# Patient Record
Sex: FEMALE | Race: WHITE | NOT HISPANIC OR LATINO | Employment: UNEMPLOYED | URBAN - METROPOLITAN AREA
[De-identification: names, ages, dates, MRNs, and addresses within clinical notes are randomized per-mention and may not be internally consistent; named-entity substitution may affect disease eponyms.]

---

## 2017-01-06 ENCOUNTER — OFFICE VISIT (OUTPATIENT)
Dept: RETAIL CLINIC | Facility: CLINIC | Age: 47
End: 2017-01-06

## 2017-01-06 VITALS
DIASTOLIC BLOOD PRESSURE: 62 MMHG | OXYGEN SATURATION: 98 % | SYSTOLIC BLOOD PRESSURE: 100 MMHG | HEART RATE: 89 BPM | RESPIRATION RATE: 20 BRPM | TEMPERATURE: 99 F

## 2017-01-06 DIAGNOSIS — J01.00 ACUTE NON-RECURRENT MAXILLARY SINUSITIS: Primary | ICD-10-CM

## 2017-01-06 PROCEDURE — 99213 OFFICE O/P EST LOW 20 MIN: CPT | Performed by: NURSE PRACTITIONER

## 2017-01-06 RX ORDER — DOXYCYCLINE HYCLATE 100 MG/1
100 TABLET, DELAYED RELEASE ORAL 2 TIMES DAILY
Qty: 20 TABLET | Refills: 0 | Status: SHIPPED | OUTPATIENT
Start: 2017-01-06 | End: 2017-01-14 | Stop reason: SINTOL

## 2017-01-06 RX ORDER — FLUTICASONE PROPIONATE 50 MCG
2 SPRAY, SUSPENSION (ML) NASAL DAILY
COMMUNITY
End: 2019-01-19

## 2017-01-06 NOTE — MR AVS SNAPSHOT
Ericka Goel   1/6/2017 5:00 PM   Office Visit    Dept Phone:  [phone]   Encounter #:  [id number]    Provider:  Nidia Zepeda   Department:  [facility]                Your Full Care Plan              Today's Medication Changes          These changes are accurate as of: 1/6/17  5:48 PM.  If you have any questions, ask your nurse or doctor.               New Medication(s)Ordered:     doxycycline 100 MG enteric coated tablet   Commonly known as:  DORYX   Take 1 tablet by mouth 2 (Two) Times a Day for 10 days.   Started by:  Nidia Zepeda         Medication(s)that have changed:     albuterol 108 (90 BASE) MCG/ACT inhaler   Commonly known as:  PROVENTIL HFA;VENTOLIN HFA   Inhale 2 puffs Every 4 (Four) Hours As Needed for wheezing.   What changed:  Another medication with the same name was removed. Continue taking this medication, and follow the directions you see here.   Changed by:  Nidia Zepeda         Stop taking medication(s)listed here:     azithromycin 250 MG tablet   Commonly known as:  ZITHROMAX   Stopped by:  Nidia Zepeda           brompheniramine-pseudoephedrine-DM 30-2-10 MG/5ML syrup   Stopped by:  Nidia Zepeda           CYMBALTA 60 MG capsule   Generic drug:  DULoxetine   Stopped by:  Nidia Zepeda           lurasidone 40 MG tablet tablet   Commonly known as:  LATUDA   Stopped by:  Nidia Zepeda                Where to Get Your Medications      These medications were sent to [facility] - [place] - [address] - [phone]  - [phone]   [address], [place] [address]     Phone:  [phone]     doxycycline 100 MG enteric coated tablet                  Your Updated Medication List          This list is accurate as of: 1/6/17  5:48 PM.  Always use your most recent med list.                albuterol 108 (90 BASE) MCG/ACT inhaler   Commonly known as:  PROVENTIL  HFA;VENTOLIN HFA   Inhale 2 puffs Every 4 (Four) Hours As Needed for wheezing.       doxycycline 100 MG enteric coated tablet   Commonly known as:  DORYX   Take 1 tablet by mouth 2 (Two) Times a Day for 10 days.       EFFEXOR PO       fluticasone 50 MCG/ACT nasal spray   Commonly known as:  FLONASE               You Were Diagnosed With        Codes Comments    Acute non-recurrent maxillary sinusitis    -  Primary ICD-10-CM: J01.00  ICD-9-CM: 461.0       Instructions    Sinusitis, Adult  Sinusitis is redness, soreness, and inflammation of the paranasal sinuses. Paranasal sinuses are air pockets within the bones of your face. They are located beneath your eyes, in the middle of your forehead, and above your eyes. In healthy paranasal sinuses, mucus is able to drain out, and air is able to circulate through them by way of your nose. However, when your paranasal sinuses are inflamed, mucus and air can become trapped. This can allow bacteria and other germs to grow and cause infection.  Sinusitis can develop quickly and last only a short time (acute) or continue over a long period (chronic). Sinusitis that lasts for more than 12 weeks is considered chronic.  CAUSES  Causes of sinusitis include:  · Allergies.  · Structural abnormalities, such as displacement of the cartilage that separates your nostrils (deviated septum), which can decrease the air flow through your nose and sinuses and affect sinus drainage.  · Functional abnormalities, such as when the small hairs (cilia) that line your sinuses and help remove mucus do not work properly or are not present.  SIGNS AND SYMPTOMS  Symptoms of acute and chronic sinusitis are the same. The primary symptoms are pain and pressure around the affected sinuses. Other symptoms include:  · Upper toothache.  · Earache.  · Headache.  · Bad breath.  · Decreased sense of smell and taste.  · A cough, which worsens when you are lying flat.  · Fatigue.  · Fever.  · Thick drainage from your  nose, which often is green and may contain pus (purulent).  · Swelling and warmth over the affected sinuses.  DIAGNOSIS  Your health care provider will perform a physical exam. During your exam, your health care provider may perform any of the following to help determine if you have acute sinusitis or chronic sinusitis:  · Look in your nose for signs of abnormal growths in your nostrils (nasal polyps).  · Tap over the affected sinus to check for signs of infection.  · View the inside of your sinuses using an imaging device that has a light attached (endoscope).  If your health care provider suspects that you have chronic sinusitis, one or more of the following tests may be recommended:  · Allergy tests.  · Nasal culture. A sample of mucus is taken from your nose, sent to a lab, and screened for bacteria.  · Nasal cytology. A sample of mucus is taken from your nose and examined by your health care provider to determine if your sinusitis is related to an allergy.  TREATMENT  Most cases of acute sinusitis are related to a viral infection and will resolve on their own within 10 days. Sometimes, medicines are prescribed to help relieve symptoms of both acute and chronic sinusitis. These may include pain medicines, decongestants, nasal steroid sprays, or saline sprays.  However, for sinusitis related to a bacterial infection, your health care provider will prescribe antibiotic medicines. These are medicines that will help kill the bacteria causing the infection.  Rarely, sinusitis is caused by a fungal infection. In these cases, your health care provider will prescribe antifungal medicine.  For some cases of chronic sinusitis, surgery is needed. Generally, these are cases in which sinusitis recurs more than 3 times per year, despite other treatments.  HOME CARE INSTRUCTIONS  · Drink plenty of water. Water helps thin the mucus so your sinuses can drain more easily.  · Use a humidifier.  · Inhale steam 3-4 times a day (for  example, sit in the bathroom with the shower running).  · Apply a warm, moist washcloth to your face 3-4 times a day, or as directed by your health care provider.  · Use saline nasal sprays to help moisten and clean your sinuses.  · Take medicines only as directed by your health care provider.  · If you were prescribed either an antibiotic or antifungal medicine, finish it all even if you start to feel better.  SEEK IMMEDIATE MEDICAL CARE IF:  · You have increasing pain or severe headaches.  · You have nausea, vomiting, or drowsiness.  · You have swelling around your face.  · You have vision problems.  · You have a stiff neck.  · You have difficulty breathing.     This information is not intended to replace advice given to you by your health care provider. Make sure you discuss any questions you have with your health care provider.     Document Released: 2006 Document Revised: 2016 Document Reviewed: 2013  [email or web address] Interactive Patient Education © [employer] Inc.       Patient Instructions History      Upcoming Appointments     Visit Type Date Time Department    OFFICE VISIT 2017  5:00 PM [facility]      [employer] Signup     [employer] allows you to send messages to your doctor, view your test results, renew your prescriptions, schedule appointments, and more. To sign up, go to [employer] and click on the Sign Up Now link in the New User? box. Enter your [employer] Activation Code exactly as it appears below along with the last four digits of your Social Security Number and your Date of Birth () to complete the sign-up process. If you do not sign up before the expiration date, you must request a new code.    [employer] Activation Code: [email or web address]  Expires: 2017  5:48 PM    If you have questions, you can email [email or web address] or call [phone] to talk to our [employer] staff. Remember, [employer] is NOT to be used for urgent needs. For medical  emergencies, dial 911.               Other Info from Your Visit           Allergies     Cephalosporins      Geodon  [Ziprasidone Hcl]      Penicillins      Sulfa Antibiotics        Reason for Visit     URI           Vital Signs     Blood Pressure Pulse Temperature Respirations Oxygen Saturation Smoking Status    100/62 89 99 °F (37.2 °C) 20 98% Current Every Day Smoker      Problems and Diagnoses Noted     Acute non-recurrent maxillary sinusitis    -  Primary

## 2017-01-06 NOTE — PROGRESS NOTES
Subjective   Ericka Goel is a 46 y.o. female.     URI    This is a new problem. Episode onset: 3 weeks ago. The problem has been gradually worsening. There has been no fever. Associated symptoms include congestion, coughing, headaches, a plugged ear sensation, rhinorrhea and sinus pain (left side only). Pertinent negatives include no abdominal pain, chest pain, diarrhea, ear pain, nausea, neck pain, rash, sneezing, sore throat, swollen glands, vomiting or wheezing. Treatments tried: OTC sinus  The treatment provided no relief.       The following portions of the patient's history were reviewed and updated as appropriate: allergies, current medications, past family history, past medical history, past social history, past surgical history and problem list.    Review of Systems   Constitutional: Positive for fatigue. Negative for appetite change, chills, diaphoresis and fever.   HENT: Positive for congestion, postnasal drip, rhinorrhea and sinus pressure. Negative for dental problem, ear discharge, ear pain, facial swelling, hearing loss, mouth sores, nosebleeds, sneezing, sore throat, tinnitus, trouble swallowing and voice change.    Eyes: Negative for pain, discharge, redness and itching.   Respiratory: Positive for cough. Negative for chest tightness, shortness of breath, wheezing and stridor.    Cardiovascular: Negative for chest pain and palpitations.   Gastrointestinal: Negative for abdominal pain, constipation, diarrhea, nausea and vomiting.   Genitourinary: Negative for decreased urine volume.   Musculoskeletal: Negative for myalgias, neck pain and neck stiffness.   Skin: Negative for rash.   Allergic/Immunologic: Positive for environmental allergies.   Neurological: Positive for headaches. Negative for dizziness, syncope and weakness.       Objective   Physical Exam   Constitutional: She is oriented to person, place, and time. She appears well-developed and well-nourished. She is cooperative.  Non-toxic  appearance. She does not appear ill. No distress.   HENT:   Right Ear: Hearing, tympanic membrane, external ear and ear canal normal.   Left Ear: Hearing, tympanic membrane, external ear and ear canal normal.   Nose: Mucosal edema and sinus tenderness present. No rhinorrhea. Right sinus exhibits no maxillary sinus tenderness and no frontal sinus tenderness. Left sinus exhibits maxillary sinus tenderness and frontal sinus tenderness.   Mouth/Throat: Uvula is midline, oropharynx is clear and moist and mucous membranes are normal. Tonsils are 1+ on the right. Tonsils are 1+ on the left. No tonsillar exudate.   Moderate amount of purulent post nasal drainage present   Eyes: Conjunctivae and lids are normal.   Cardiovascular: Normal rate, regular rhythm, S1 normal and S2 normal.    Pulmonary/Chest: Effort normal and breath sounds normal.   Abdominal: Soft. Normal appearance and bowel sounds are normal. There is no tenderness.   Lymphadenopathy:     She has no cervical adenopathy.   Neurological: She is alert and oriented to person, place, and time.   Skin: Skin is warm and dry. She is not diaphoretic. No pallor.   Vitals reviewed.      Assessment/Plan   Ericka was seen today for uri.    Diagnoses and all orders for this visit:    Acute non-recurrent maxillary sinusitis    Other orders  -     doxycycline (DORYX) 100 MG enteric coated tablet; Take 1 tablet by mouth 2 (Two) Times a Day for 10 days.        -     F/u with PCP for persistent or worsening symptoms

## 2017-01-06 NOTE — PATIENT INSTRUCTIONS

## 2017-01-14 ENCOUNTER — OFFICE VISIT (OUTPATIENT)
Dept: RETAIL CLINIC | Facility: CLINIC | Age: 47
End: 2017-01-14

## 2017-01-14 VITALS
RESPIRATION RATE: 18 BRPM | DIASTOLIC BLOOD PRESSURE: 78 MMHG | TEMPERATURE: 98.8 F | OXYGEN SATURATION: 98 % | HEART RATE: 93 BPM | SYSTOLIC BLOOD PRESSURE: 130 MMHG

## 2017-01-14 DIAGNOSIS — H65.191 OTHER ACUTE NONSUPPURATIVE OTITIS MEDIA OF RIGHT EAR, RECURRENCE NOT SPECIFIED: Primary | ICD-10-CM

## 2017-01-14 DIAGNOSIS — R09.81 CONGESTION OF NASAL SINUS: ICD-10-CM

## 2017-01-14 PROBLEM — H92.09 EAR PAIN: Status: ACTIVE | Noted: 2017-01-14

## 2017-01-14 PROBLEM — J34.9 SINUS PROBLEM: Status: ACTIVE | Noted: 2017-01-14

## 2017-01-14 PROCEDURE — 99213 OFFICE O/P EST LOW 20 MIN: CPT | Performed by: NURSE PRACTITIONER

## 2017-01-14 RX ORDER — AZITHROMYCIN 250 MG/1
TABLET, FILM COATED ORAL
Qty: 6 TABLET | Refills: 0 | Status: SHIPPED | OUTPATIENT
Start: 2017-01-14 | End: 2017-05-31

## 2017-01-14 RX ORDER — ECHINACEA PURPUREA EXTRACT 125 MG
2 TABLET ORAL AS NEEDED
Qty: 1 EACH | Refills: 0 | Status: SHIPPED | OUTPATIENT
Start: 2017-01-14 | End: 2017-01-24

## 2017-01-14 NOTE — PROGRESS NOTES
Kimberly Barnett Amando is a 46 y.o. female.     Earache    There is pain in the right ear. This is a new problem. The current episode started in the past 7 days. The problem occurs constantly. The problem has been gradually worsening. There has been no fever. The pain is at a severity of 5/10. The pain is moderate. Associated symptoms include headaches and rhinorrhea. Pertinent negatives include no coughing, diarrhea, ear discharge, neck pain, rash or vomiting. She has tried NSAIDs (recently stopped doxycycline due to side effects) for the symptoms. The treatment provided mild relief.       The following portions of the patient's history were reviewed and updated as appropriate: allergies, current medications, past family history, past medical history, past social history, past surgical history and problem list.    Review of Systems   Constitutional: Positive for activity change, appetite change and fatigue.   HENT: Positive for congestion, ear pain, rhinorrhea and sinus pressure. Negative for ear discharge.    Eyes: Negative for discharge.   Respiratory: Negative for cough, chest tightness and wheezing.    Cardiovascular: Negative for chest pain and palpitations.   Gastrointestinal: Negative for abdominal distention, blood in stool, constipation, diarrhea, nausea and vomiting.   Endocrine: Negative for cold intolerance.   Genitourinary: Negative for difficulty urinating.   Musculoskeletal: Negative for gait problem, neck pain and neck stiffness.   Skin: Negative for color change, pallor and rash.   Allergic/Immunologic: Positive for environmental allergies.   Neurological: Positive for headaches. Negative for dizziness.   Psychiatric/Behavioral: Negative for agitation, behavioral problems and confusion.   All other systems reviewed and are negative.      Objective   Physical Exam   Constitutional: She is oriented to person, place, and time. She appears well-developed and well-nourished.   HENT:   Head:  Normocephalic.   Right Ear: Hearing, external ear and ear canal normal. There is tenderness. Tympanic membrane is erythematous and retracted. Tympanic membrane mobility is abnormal.   Left Ear: Hearing, tympanic membrane, external ear and ear canal normal.   Eyes: Conjunctivae, EOM and lids are normal. Pupils are equal, round, and reactive to light.   Neck: Trachea normal and full passive range of motion without pain.   Cardiovascular: Normal rate, regular rhythm, normal heart sounds and normal pulses.    Pulmonary/Chest: Effort normal and breath sounds normal.   Abdominal: Soft. Normal appearance and bowel sounds are normal. There is no tenderness.   Musculoskeletal: Normal range of motion.   Lymphadenopathy:     She has no cervical adenopathy.   Neurological: She is alert and oriented to person, place, and time. She has normal strength.   Skin: Skin is warm, dry and intact. No rash noted.   Psychiatric: She has a normal mood and affect. Her speech is normal and behavior is normal. Judgment and thought content normal. Cognition and memory are normal.       Assessment/Plan   Ericka was seen today for earache.    Diagnoses and all orders for this visit:    Other acute nonsuppurative otitis media of right ear, recurrence not specified  -     azithromycin (ZITHROMAX) 250 MG tablet; Take 2 tablets the first day, then 1 tablet daily for 4 days.    Congestion of nasal sinus  -     sodium chloride (OCEAN NASAL SPRAY) 0.65 % nasal spray; 2 sprays into each nostril As Needed for congestion for up to 10 days.       This patient stopped taking the doxycycline she was prescribed one week ago due to a burning sensation in the esophagus.  She presents with ear pain and several allergies to different types of medications.  For this reason, she was placed on azithromycin.  Patient instructed to return to PCP or seek ER if not improving.

## 2017-01-14 NOTE — PATIENT INSTRUCTIONS
Otitis Media, Adult  Otitis media is redness, soreness, and puffiness (swelling) in the space just behind your eardrum (middle ear). It may be caused by allergies or infection. It often happens along with a cold.  HOME CARE  · Take your medicine as told. Finish it even if you start to feel better.  · Only take over-the-counter or prescription medicines for pain, discomfort, or fever as told by your doctor.  · Follow up with your doctor as told.  GET HELP IF:  · You have otitis media only in one ear, or bleeding from your nose, or both.  · You notice a lump on your neck.  · You are not getting better in 3-5 days.  · You feel worse instead of better.  GET HELP RIGHT AWAY IF:   · You have pain that is not helped with medicine.  · You have puffiness, redness, or pain around your ear.  · You get a stiff neck.  · You cannot move part of your face (paralysis).  · You notice that the bone behind your ear hurts when you touch it.  MAKE SURE YOU:   · Understand these instructions.  · Will watch your condition.  · Will get help right away if you are not doing well or get worse.     This information is not intended to replace advice given to you by your health care provider. Make sure you discuss any questions you have with your health care provider.     Document Released: 06/05/2009 Document Revised: 01/08/2016 Document Reviewed: 07/15/2014  [employer] Interactive Patient Education ©2016 [employer] Inc.

## 2017-01-14 NOTE — MR AVS SNAPSHOT
Ericka Goel   1/14/2017 3:00 PM   Office Visit    Dept Phone:  [phone]   Encounter #:  [id number]    Provider:  Nidia Zepeda   Department:  [facility]                Your Full Care Plan              Today's Medication Changes          These changes are accurate as of: 1/14/17  3:28 PM.  If you have any questions, ask your nurse or doctor.               New Medication(s)Ordered:     azithromycin 250 MG tablet   Commonly known as:  ZITHROMAX   Take 2 tablets the first day, then 1 tablet daily for 4 days.   Started by:  Nidia Zepeda       sodium chloride 0.65 % nasal spray   Commonly known as:  OCEAN NASAL SPRAY   2 sprays into each nostril As Needed for congestion for up to 10 days.   Started by:  Provider Lauren Zepeda         Stop taking medication(s)listed here:     doxycycline 100 MG enteric coated tablet   Commonly known as:  DORYX   Stopped by:  Provider Lauren Zepeda                Where to Get Your Medications      These medications were sent to [facility]/pharmacy #[id number] - [facility] [address] [address] AT [address] [phone] PH - [phone] [address] [address]     Phone:  [phone]     azithromycin 250 MG tablet    sodium chloride 0.65 % nasal spray                  Your Updated Medication List          This list is accurate as of: 1/14/17  3:28 PM.  Always use your most recent med list.                albuterol 108 (90 BASE) MCG/ACT inhaler   Commonly known as:  PROVENTIL HFA;VENTOLIN HFA   Inhale 2 puffs Every 4 (Four) Hours As Needed for wheezing.       azithromycin 250 MG tablet   Commonly known as:  ZITHROMAX   Take 2 tablets the first day, then 1 tablet daily for 4 days.       EFFEXOR PO       fluticasone 50 MCG/ACT nasal spray   Commonly known as:  FLONASE       sodium chloride 0.65 % nasal spray   Commonly known as:  OCEAN NASAL SPRAY   2 sprays into each nostril As Needed for congestion for up  to 10 days.               You Were Diagnosed With        Codes Comments    Other acute nonsuppurative otitis media of right ear, recurrence not specified    -  Primary ICD-10-CM: H65.191  ICD-9-CM: 381.00       Instructions    Otitis Media, Adult  Otitis media is redness, soreness, and puffiness (swelling) in the space just behind your eardrum (middle ear). It may be caused by allergies or infection. It often happens along with a cold.  HOME CARE  · Take your medicine as told. Finish it even if you start to feel better.  · Only take over-the-counter or prescription medicines for pain, discomfort, or fever as told by your doctor.  · Follow up with your doctor as told.  GET HELP IF:  · You have otitis media only in one ear, or bleeding from your nose, or both.  · You notice a lump on your neck.  · You are not getting better in 3-5 days.  · You feel worse instead of better.  GET HELP RIGHT AWAY IF:   · You have pain that is not helped with medicine.  · You have puffiness, redness, or pain around your ear.  · You get a stiff neck.  · You cannot move part of your face (paralysis).  · You notice that the bone behind your ear hurts when you touch it.  MAKE SURE YOU:   · Understand these instructions.  · Will watch your condition.  · Will get help right away if you are not doing well or get worse.     This information is not intended to replace advice given to you by your health care provider. Make sure you discuss any questions you have with your health care provider.     Document Released: 06/05/2009 Document Revised: 01/08/2016 Document Reviewed: 07/15/2014  [employer] Interactive Patient Education ©2016 [employer]       Patient Instructions History      Upcoming Appointments     Visit Type Date Time Department    OFFICE VISIT 1/14/2017  3:00 PM [facility]      [employer] [facility] [employer] allows you to send messages to your doctor, view your test results, renew your prescriptions, schedule  appointments, and more. To sign up, go to [email or web address] and click on the Sign Up Now link in the New User? box. Enter your [employer] Activation Code exactly as it appears below along with the last four digits of your Social Security Number and your Date of Birth () to complete the sign-up process. If you do not sign up before the expiration date, you must request a new code.    [employer] Activation Code: [email or web address]  Expires: 2017  5:48 PM    If you have questions, you can email [email or web address] or call [phone] to talk to our [employer] staff. Remember, [employer] is NOT to be used for urgent needs. For medical emergencies, dial 911.               Other Info from Your Visit           Allergies     Cephalosporins      Doxycycline Hyclate  Other (See Comments)    Esophagus burns    Geodon  [Ziprasidone Hcl]      Penicillins      Sulfa Antibiotics        Vital Signs     Blood Pressure Pulse Temperature Respirations Last Menstrual Period Oxygen Saturation    130/78 (BP Location: Left arm, Patient Position: Sitting, Cuff Size: Adult) 93 98.8 °F (37.1 °C) (Oral) 18 2016 98%    Smoking Status                   Current Every Day Smoker           Problems and Diagnoses Noted     Disorder of nasal sinus    Middle ear infection    -  Primary

## 2017-02-23 ENCOUNTER — TRANSCRIBE ORDERS (OUTPATIENT)
Dept: ADMINISTRATIVE | Facility: HOSPITAL | Age: 47
End: 2017-02-23

## 2017-03-07 ENCOUNTER — TRANSCRIBE ORDERS (OUTPATIENT)
Dept: ADMINISTRATIVE | Facility: HOSPITAL | Age: 47
End: 2017-03-07

## 2017-03-07 DIAGNOSIS — J33.9 NASAL POLYPS: Primary | ICD-10-CM

## 2017-03-21 ENCOUNTER — HOSPITAL ENCOUNTER (OUTPATIENT)
Dept: CT IMAGING | Facility: HOSPITAL | Age: 47
Discharge: HOME | End: 2017-03-21
Admitting: FAMILY MEDICINE

## 2017-03-21 DIAGNOSIS — J33.9 NASAL POLYPS: ICD-10-CM

## 2017-03-21 PROCEDURE — 70486 CT MAXILLOFACIAL W/O DYE: CPT

## 2017-05-31 ENCOUNTER — OFFICE VISIT (OUTPATIENT)
Dept: OBSTETRICS AND GYNECOLOGY | Facility: CLINIC | Age: 47
End: 2017-05-31

## 2017-05-31 VITALS
BODY MASS INDEX: 22.5 KG/M2 | DIASTOLIC BLOOD PRESSURE: 64 MMHG | HEIGHT: 66 IN | WEIGHT: 140 LBS | SYSTOLIC BLOOD PRESSURE: 112 MMHG

## 2017-05-31 DIAGNOSIS — Z11.3 SCREEN FOR STD (SEXUALLY TRANSMITTED DISEASE): ICD-10-CM

## 2017-05-31 DIAGNOSIS — Z13.9 SCREENING: Primary | ICD-10-CM

## 2017-05-31 DIAGNOSIS — Z01.419 ENCOUNTER FOR GYNECOLOGICAL EXAMINATION WITHOUT ABNORMAL FINDING: ICD-10-CM

## 2017-05-31 DIAGNOSIS — F17.200 SMOKER: ICD-10-CM

## 2017-05-31 LAB
B-HCG UR QL: NEGATIVE
BILIRUB BLD-MCNC: NEGATIVE MG/DL
CLARITY, POC: CLEAR
COLOR UR: YELLOW
GLUCOSE UR STRIP-MCNC: NEGATIVE MG/DL
INTERNAL NEGATIVE CONTROL: NEGATIVE
INTERNAL POSITIVE CONTROL: POSITIVE
KETONES UR QL: NEGATIVE
LEUKOCYTE EST, POC: NEGATIVE
Lab: NORMAL
NITRITE UR-MCNC: NEGATIVE MG/ML
PH UR: 7 [PH] (ref 5–8)
PROT UR STRIP-MCNC: NEGATIVE MG/DL
RBC # UR STRIP: NEGATIVE /UL
SP GR UR: 1.02 (ref 1–1.03)
UROBILINOGEN UR QL: NORMAL

## 2017-05-31 PROCEDURE — 99396 PREV VISIT EST AGE 40-64: CPT | Performed by: OBSTETRICS & GYNECOLOGY

## 2017-05-31 PROCEDURE — 99406 BEHAV CHNG SMOKING 3-10 MIN: CPT | Performed by: OBSTETRICS & GYNECOLOGY

## 2017-05-31 PROCEDURE — 81002 URINALYSIS NONAUTO W/O SCOPE: CPT | Performed by: OBSTETRICS & GYNECOLOGY

## 2017-05-31 PROCEDURE — 81025 URINE PREGNANCY TEST: CPT | Performed by: OBSTETRICS & GYNECOLOGY

## 2017-05-31 RX ORDER — VENLAFAXINE HYDROCHLORIDE 150 MG/1
CAPSULE, EXTENDED RELEASE ORAL
Refills: 3 | COMMUNITY
Start: 2017-05-03 | End: 2018-07-19

## 2017-05-31 RX ORDER — LURASIDONE HYDROCHLORIDE 40 MG/1
TABLET, FILM COATED ORAL
Refills: 0 | COMMUNITY
Start: 2017-04-17 | End: 2018-07-19

## 2017-05-31 RX ORDER — FENOFIBRATE 160 MG/1
TABLET ORAL
Refills: 2 | COMMUNITY
Start: 2017-04-01 | End: 2017-05-31

## 2017-06-01 LAB
HBV SURFACE AG SERPL QL IA: NEGATIVE
HCV AB S/CO SERPL IA: <0.1 S/CO RATIO (ref 0–0.9)
HIV 1 & 2 AB SERPLBLD IA.RAPID: NORMAL
HIV 2 AB SERPLBLD QL IA.RAPID: NEGATIVE
HIV1 AB SERPLBLD QL IA.RAPID: NEGATIVE
HSV1 IGG SER IA-ACNC: 32.1 INDEX (ref 0–0.9)
HSV2 IGG SER IA-ACNC: <0.91 INDEX (ref 0–0.9)
RPR SER QL: NORMAL

## 2017-06-06 LAB
C TRACH RRNA CVX QL NAA+PROBE: NEGATIVE
CYTOLOGIST CVX/VAG CYTO: NORMAL
DX ICD CODE: NORMAL
HIV 1 & 2 AB SER-IMP: NORMAL
HPV I/H RISK 1 DNA CVX QL PROBE+SIG AMP: NEGATIVE
Lab: NORMAL
N GONORRHOEA RRNA CVX QL NAA+PROBE: NEGATIVE
OTHER STN SPEC: NORMAL
PATH REPORT.FINAL DX SPEC: NORMAL
STAT OF ADQ CVX/VAG CYTO-IMP: NORMAL

## 2017-11-30 ENCOUNTER — TRANSCRIBE ORDERS (OUTPATIENT)
Dept: ADMINISTRATIVE | Facility: HOSPITAL | Age: 47
End: 2017-11-30

## 2017-11-30 DIAGNOSIS — Z12.39 ENCOUNTER FOR SCREENING BREAST EXAMINATION: Primary | ICD-10-CM

## 2018-07-09 ENCOUNTER — OFFICE VISIT (OUTPATIENT)
Dept: OBSTETRICS AND GYNECOLOGY | Facility: CLINIC | Age: 48
End: 2018-07-09

## 2018-07-09 ENCOUNTER — TELEPHONE (OUTPATIENT)
Dept: OBSTETRICS AND GYNECOLOGY | Facility: CLINIC | Age: 48
End: 2018-07-09

## 2018-07-09 VITALS
SYSTOLIC BLOOD PRESSURE: 104 MMHG | BODY MASS INDEX: 20.76 KG/M2 | WEIGHT: 129.2 LBS | DIASTOLIC BLOOD PRESSURE: 75 MMHG | HEIGHT: 66 IN

## 2018-07-09 DIAGNOSIS — Z01.419 WELL WOMAN EXAM WITH ROUTINE GYNECOLOGICAL EXAM: Primary | ICD-10-CM

## 2018-07-09 DIAGNOSIS — Z30.09 CONTRACEPTIVE EDUCATION: ICD-10-CM

## 2018-07-09 DIAGNOSIS — F17.200 SMOKER: ICD-10-CM

## 2018-07-09 DIAGNOSIS — Z11.3 SCREEN FOR STD (SEXUALLY TRANSMITTED DISEASE): ICD-10-CM

## 2018-07-09 LAB
BILIRUB BLD-MCNC: NEGATIVE MG/DL
CLARITY, POC: CLEAR
COLOR UR: YELLOW
GLUCOSE UR STRIP-MCNC: NEGATIVE MG/DL
KETONES UR QL: NEGATIVE
LEUKOCYTE EST, POC: NEGATIVE
NITRITE UR-MCNC: NEGATIVE MG/ML
PH UR: 5 [PH] (ref 5–8)
PROT UR STRIP-MCNC: NEGATIVE MG/DL
RBC # UR STRIP: NEGATIVE /UL
SP GR UR: 1 (ref 1–1.03)
UROBILINOGEN UR QL: NORMAL

## 2018-07-09 PROCEDURE — 99406 BEHAV CHNG SMOKING 3-10 MIN: CPT | Performed by: OBSTETRICS & GYNECOLOGY

## 2018-07-09 PROCEDURE — 99396 PREV VISIT EST AGE 40-64: CPT | Performed by: OBSTETRICS & GYNECOLOGY

## 2018-07-09 NOTE — PROGRESS NOTES
GYN Annual Exam     CC- Here for annual exam.     Ericka Goel is a 48 y.o. female established patient who presents for annual well woman exam. Periods are regular every 28-30 days, lasting 4 days. She is getting  and needs birth control. We had a long discussion about BTL and got as far as the scheduling office and then she cancelled her procedure.     OB History      Para Term  AB Living    1         1    SAB TAB Ectopic Molar Multiple Live Births                       Obstetric Comments    1           Menarche: 16  Current contraception: none  History of abnormal Pap smear: yes - 1 abnl with nl f/u  History of abnormal mammogram: no  Family history of uterine, colon or ovarian cancer: no  Family history of breast cancer: no  H/o STDs: none  Gardasil: none    Health Maintenance   Topic Date Due   • ANNUAL PHYSICAL  1975   • PNEUMOCOCCAL VACCINE (19-64 MEDIUM RISK) (1 of 1 - PPSV23) 1991   • TDAP/TD VACCINES (1 - Tdap) 1991   • PAP SMEAR  2016   • LIPID PANEL  2017   • INFLUENZA VACCINE  2018       Past Medical History:   Diagnosis Date   • Abnormal Pap smear of cervix     1 abnormal with normal f/u   • Asthma    • Depression    • Herpes     HSV 1 +   • Schizoaffective disorder (CMS/HCC)        Past Surgical History:   Procedure Laterality Date   • CHOLECYSTECTOMY     • CYST REMOVAL      chin and knee         Current Outpatient Prescriptions:   •  albuterol (PROVENTIL HFA;VENTOLIN HFA) 108 (90 BASE) MCG/ACT inhaler, Inhale 2 puffs Every 4 (Four) Hours As Needed for wheezing., Disp: 1 inhaler, Rfl: 0  •  fluticasone (FLONASE) 50 MCG/ACT nasal spray, 2 sprays into each nostril Daily., Disp: , Rfl:   •  LATUDA 40 MG tablet tablet, TAKE 1 TABLET BY MOUTH EVERY EVENING, Disp: , Rfl: 0  •  venlafaxine XR (EFFEXOR-XR) 150 MG 24 hr capsule, TAKE ONE CAPSULE BY MOUTH EVERY DAY, Disp: , Rfl: 3    Allergies   Allergen Reactions   • Cephalosporins    • Geodon   "[Ziprasidone Hcl]    • Penicillins    • Sulfa Antibiotics        Social History   Substance Use Topics   • Smoking status: Current Every Day Smoker     Packs/day: 1.00   • Smokeless tobacco: Not on file   • Alcohol use No       Family History   Problem Relation Age of Onset   • Heart disease Mother    • Heart disease Father    • Protein S deficiency Sister         pt is neg   • Breast cancer Neg Hx    • Ovarian cancer Neg Hx    • Colon cancer Neg Hx        Review of Systems   Constitutional: Negative for appetite change, fatigue, fever and unexpected weight change.   Respiratory: Negative for cough and shortness of breath.    Cardiovascular: Negative for chest pain and palpitations.   Gastrointestinal: Negative for abdominal distention, abdominal pain, constipation, diarrhea and nausea.   Endocrine: Negative for cold intolerance and heat intolerance.   Genitourinary: Negative for dyspareunia, dysuria, menstrual problem, pelvic pain and vaginal discharge.   Skin: Negative for color change and rash.   Neurological: Negative for headaches.   Psychiatric/Behavioral: Positive for dysphoric mood. The patient is not nervous/anxious.        /75   Ht 166.4 cm (65.51\")   Wt 58.6 kg (129 lb 3.2 oz)   LMP 07/01/2018 (Exact Date)   Breastfeeding? No   BMI 21.17 kg/m²     Physical Exam   Constitutional: She is oriented to person, place, and time. She appears well-developed and well-nourished.   HENT:   Head: Normocephalic and atraumatic.   Eyes: Conjunctivae are normal. No scleral icterus.   Neck: Neck supple. No thyromegaly present.   Cardiovascular: Normal rate, regular rhythm and normal heart sounds.    Pulmonary/Chest: Effort normal and breath sounds normal. Right breast exhibits no inverted nipple, no mass, no nipple discharge, no skin change and no tenderness. Left breast exhibits no inverted nipple, no mass, no nipple discharge, no skin change and no tenderness.   Abdominal: Soft. Bowel sounds are normal. She " exhibits no distension and no mass. There is no tenderness. There is no rebound and no guarding. No hernia.   Genitourinary: Uterus normal.       Pelvic exam was performed with patient supine. There is no rash, tenderness or lesion on the right labia. There is no rash, tenderness or lesion on the left labia. Cervix exhibits no motion tenderness, no discharge and no friability. Right adnexum displays no mass, no tenderness and no fullness. Left adnexum displays no mass, no tenderness and no fullness. No erythema, tenderness or bleeding in the vagina. No foreign body in the vagina. No signs of injury around the vagina. No vaginal discharge found.   Neurological: She is alert and oriented to person, place, and time.   Skin: Skin is warm and dry.   Psychiatric: She has a normal mood and affect. Her behavior is normal. Judgment and thought content normal.   Nursing note and vitals reviewed.         Assessment/Plan    1) GYN HM: normal pap/HPV 5/2017    SBE demonstrated and encouraged.  2) STD screening: accepts Condoms encouraged.  3) Contraception: Discussed with patient at length risk, benefits and alternatives to all contraceptive options, including oral progesterone only), Dep Provera, condoms, diaphragm, cervical caps, as well as long active but reversible forms such as Nexplanon and all IUD’s.  Differences in birth control and cycle control between methods were outlined along with correct usage for each method.  After discussion, the patient is most interest in BTL but then cancelled her procedure.   4) Family Planning: no plans, encourage folic acid daily  5) Diet and Exercise discussed  6) Smoking Status: I advised Ericka of the risks of continuing to use tobacco, and I provided her with tobacco cessation educational materials. During this visit, I spent 4 minutes counseling the patient regarding tobacco cessation.  7) Social:   8) MMG- schedule, enc pt to schedule.   9)Follow up prn or 1 year        Ericka was seen today for gynecologic exam.    Diagnoses and all orders for this visit:    Well woman exam with routine gynecological exam  -     POC Urinalysis Dipstick  -     Chlamydia trachomatis, Neisseria gonorrhoeae, Trichomonas vaginalis, PCR - Urine, Urine, Random Void  -     Hepatitis B Surface Antigen  -     Hepatitis C Antibody  -     HIV-1 / O / 2 Ag / Antibody 4th Generation  -     HSV 1 & 2 - Specific Antibody, IgG  -     RPR, Rfx Qn RPR / Confirm TP    Contraceptive education    Screen for STD (sexually transmitted disease)    Smoker          Radha Manzanares MD  7/9/2018  8:59 PM

## 2018-07-10 LAB
HBV SURFACE AG SERPL QL IA: NEGATIVE
HCV AB S/CO SERPL IA: <0.1 S/CO RATIO (ref 0–0.9)
HIV 1+2 AB+HIV1 P24 AG SERPL QL IA: NON REACTIVE
HSV1 IGG SER IA-ACNC: 33.5 INDEX (ref 0–0.9)
HSV2 IGG SER IA-ACNC: <0.91 INDEX (ref 0–0.9)
RPR SER QL: NON REACTIVE

## 2018-07-10 NOTE — PROGRESS NOTES
PIP= blood portion of STD panel shows only previous exposure to the cold sore virus. She is negative for hepatitis, HIV, syphilis and genital herpes

## 2018-07-10 NOTE — TELEPHONE ENCOUNTER
No not at this time, she seemed very confused about everything, not sure what was going on. But I did ask her twice if she needed any birth control and she said no.

## 2018-07-11 LAB
C TRACH RRNA SPEC QL NAA+PROBE: NEGATIVE
N GONORRHOEA RRNA SPEC QL NAA+PROBE: NEGATIVE
T VAGINALIS RRNA SPEC QL NAA+PROBE: NEGATIVE

## 2018-07-19 ENCOUNTER — HOSPITAL ENCOUNTER (EMERGENCY)
Facility: HOSPITAL | Age: 48
Discharge: HOME | End: 2018-07-19
Attending: EMERGENCY MEDICINE | Admitting: EMERGENCY MEDICINE

## 2018-07-19 ENCOUNTER — APPOINTMENT (OUTPATIENT)
Dept: GENERAL RADIOLOGY | Facility: HOSPITAL | Age: 48
End: 2018-07-19

## 2018-07-19 VITALS
SYSTOLIC BLOOD PRESSURE: 101 MMHG | BODY MASS INDEX: 21.33 KG/M2 | HEIGHT: 65 IN | HEART RATE: 65 BPM | OXYGEN SATURATION: 96 % | DIASTOLIC BLOOD PRESSURE: 63 MMHG | TEMPERATURE: 98 F | RESPIRATION RATE: 16 BRPM | WEIGHT: 128 LBS

## 2018-07-19 DIAGNOSIS — S29.011A CHEST WALL MUSCLE STRAIN, INITIAL ENCOUNTER: ICD-10-CM

## 2018-07-19 DIAGNOSIS — S13.4XXA WHIPLASH INJURIES, INITIAL ENCOUNTER: Primary | ICD-10-CM

## 2018-07-19 PROCEDURE — 93010 ELECTROCARDIOGRAM REPORT: CPT | Performed by: INTERNAL MEDICINE

## 2018-07-19 PROCEDURE — 93005 ELECTROCARDIOGRAM TRACING: CPT

## 2018-07-19 PROCEDURE — 93005 ELECTROCARDIOGRAM TRACING: CPT | Performed by: EMERGENCY MEDICINE

## 2018-07-19 PROCEDURE — 72040 X-RAY EXAM NECK SPINE 2-3 VW: CPT

## 2018-07-19 PROCEDURE — 99284 EMERGENCY DEPT VISIT MOD MDM: CPT | Performed by: EMERGENCY MEDICINE

## 2018-07-19 PROCEDURE — 99283 EMERGENCY DEPT VISIT LOW MDM: CPT

## 2018-07-19 RX ORDER — PREDNISONE 20 MG/1
TABLET ORAL
Qty: 10 TABLET | Refills: 0 | Status: SHIPPED | OUTPATIENT
Start: 2018-07-19 | End: 2018-10-04

## 2018-07-19 RX ORDER — CYCLOBENZAPRINE HCL 10 MG
10 TABLET ORAL ONCE
Status: DISCONTINUED | OUTPATIENT
Start: 2018-07-19 | End: 2018-07-19 | Stop reason: HOSPADM

## 2018-07-19 RX ORDER — METHOCARBAMOL 500 MG/1
500 TABLET, FILM COATED ORAL 4 TIMES DAILY PRN
Qty: 15 TABLET | Refills: 0 | Status: SHIPPED | OUTPATIENT
Start: 2018-07-19 | End: 2019-01-19

## 2018-10-04 ENCOUNTER — OFFICE VISIT (OUTPATIENT)
Dept: RETAIL CLINIC | Facility: CLINIC | Age: 48
End: 2018-10-04

## 2018-10-04 VITALS
OXYGEN SATURATION: 96 % | DIASTOLIC BLOOD PRESSURE: 68 MMHG | TEMPERATURE: 98.9 F | SYSTOLIC BLOOD PRESSURE: 110 MMHG | RESPIRATION RATE: 18 BRPM | HEART RATE: 98 BPM

## 2018-10-04 DIAGNOSIS — J32.0 MAXILLARY SINUSITIS, UNSPECIFIED CHRONICITY: ICD-10-CM

## 2018-10-04 DIAGNOSIS — J40 BRONCHITIS: Primary | ICD-10-CM

## 2018-10-04 PROCEDURE — 99213 OFFICE O/P EST LOW 20 MIN: CPT | Performed by: NURSE PRACTITIONER

## 2018-10-04 RX ORDER — PREDNISONE 20 MG/1
20 TABLET ORAL 2 TIMES DAILY
Qty: 10 TABLET | Refills: 0 | Status: SHIPPED | OUTPATIENT
Start: 2018-10-04 | End: 2018-10-09

## 2018-10-04 RX ORDER — ALBUTEROL SULFATE 90 UG/1
2 AEROSOL, METERED RESPIRATORY (INHALATION) EVERY 4 HOURS PRN
Qty: 1 INHALER | Refills: 0 | Status: SHIPPED | OUTPATIENT
Start: 2018-10-04 | End: 2018-11-03

## 2018-10-04 RX ORDER — DOXYCYCLINE HYCLATE 100 MG
100 TABLET ORAL 2 TIMES DAILY
Qty: 14 TABLET | Refills: 0 | Status: SHIPPED | OUTPATIENT
Start: 2018-10-04 | End: 2018-10-11

## 2018-10-04 NOTE — PATIENT INSTRUCTIONS
Sinusitis, Adult  Sinusitis is soreness and inflammation of your sinuses. Sinuses are hollow spaces in the bones around your face. Your sinuses are located:  · Around your eyes.  · In the middle of your forehead.  · Behind your nose.  · In your cheekbones.    Your sinuses and nasal passages are lined with a stringy fluid (mucus). Mucus normally drains out of your sinuses. When your nasal tissues become inflamed or swollen, the mucus can become trapped or blocked so air cannot flow through your sinuses. This allows bacteria, viruses, and funguses to grow, which leads to infection.  Sinusitis can develop quickly and last for 7?10 days (acute) or for more than 12 weeks (chronic). Sinusitis often develops after a cold.  What are the causes?  This condition is caused by anything that creates swelling in the sinuses or stops mucus from draining, including:  · Allergies.  · Asthma.  · Bacterial or viral infection.  · Abnormally shaped bones between the nasal passages.  · Nasal growths that contain mucus (nasal polyps).  · Narrow sinus openings.  · Pollutants, such as chemicals or irritants in the air.  · A foreign object stuck in the nose.  · A fungal infection. This is rare.    What increases the risk?  The following factors may make you more likely to develop this condition:  · Having allergies or asthma.  · Having had a recent cold or respiratory tract infection.  · Having structural deformities or blockages in your nose or sinuses.  · Having a weak immune system.  · Doing a lot of swimming or diving.  · Overusing nasal sprays.  · Smoking.    What are the signs or symptoms?  The main symptoms of this condition are pain and a feeling of pressure around the affected sinuses. Other symptoms include:  · Upper toothache.  · Earache.  · Headache.  · Bad breath.  · Decreased sense of smell and taste.  · A cough that may get worse at night.  · Fatigue.  · Fever.  · Thick drainage from your nose. The drainage is often green and  it may contain pus (purulent).  · Stuffy nose or congestion.  · Postnasal drip. This is when extra mucus collects in the throat or back of the nose.  · Swelling and warmth over the affected sinuses.  · Sore throat.  · Sensitivity to light.    How is this diagnosed?  This condition is diagnosed based on symptoms, a medical history, and a physical exam. To find out if your condition is acute or chronic, your health care provider may:  · Look in your nose for signs of nasal polyps.  · Tap over the affected sinus to check for signs of infection.  · View the inside of your sinuses using an imaging device that has a light attached (endoscope).    If your health care provider suspects that you have chronic sinusitis, you may also:  · Be tested for allergies.  · Have a sample of mucus taken from your nose (nasal culture) and checked for bacteria.  · Have a mucus sample examined to see if your sinusitis is related to an allergy.    If your sinusitis does not respond to treatment and it lasts longer than 8 weeks, you may have an MRI or CT scan to check your sinuses. These scans also help to determine how severe your infection is.  In rare cases, a bone biopsy may be done to rule out more serious types of fungal sinus disease.  How is this treated?  Treatment for sinusitis depends on the cause and whether your condition is chronic or acute. If a virus is causing your sinusitis, your symptoms will go away on their own within 10 days. You may be given medicines to relieve your symptoms, including:  · Topical nasal decongestants. They shrink swollen nasal passages and let mucus drain from your sinuses.  · Antihistamines. These drugs block inflammation that is triggered by allergies. This can help to ease swelling in your nose and sinuses.  · Topical nasal corticosteroids. These are nasal sprays that ease inflammation and swelling in your nose and sinuses.  · Nasal saline washes. These rinses can help to get rid of thick mucus in  your nose.    If your condition is caused by bacteria, you will be given an antibiotic medicine. If your condition is caused by a fungus, you will be given an antifungal medicine.  Surgery may be needed to correct underlying conditions, such as narrow nasal passages. Surgery may also be needed to remove polyps.  Follow these instructions at home:  Medicines  · Take, use, or apply over-the-counter and prescription medicines only as told by your health care provider. These may include nasal sprays.  · If you were prescribed an antibiotic medicine, take it as told by your health care provider. Do not stop taking the antibiotic even if you start to feel better.  Hydrate and Humidify  · Drink enough water to keep your urine clear or pale yellow. Staying hydrated will help to thin your mucus.  · Use a cool mist humidifier to keep the humidity level in your home above 50%.  · Inhale steam for 10-15 minutes, 3-4 times a day or as told by your health care provider. You can do this in the bathroom while a hot shower is running.  · Limit your exposure to cool or dry air.  Rest  · Rest as much as possible.  · Sleep with your head raised (elevated).  · Make sure to get enough sleep each night.  General instructions  · Apply a warm, moist washcloth to your face 3-4 times a day or as told by your health care provider. This will help with discomfort.  · Wash your hands often with soap and water to reduce your exposure to viruses and other germs. If soap and water are not available, use hand .  · Do not smoke. Avoid being around people who are smoking (secondhand smoke).  · Keep all follow-up visits as told by your health care provider. This is important.  Contact a health care provider if:  · You have a fever.  · Your symptoms get worse.  · Your symptoms do not improve within 10 days.  Get help right away if:  · You have a severe headache.  · You have persistent vomiting.  · You have pain or swelling around your face or  eyes.  · You have vision problems.  · You develop confusion.  · Your neck is stiff.  · You have trouble breathing.  This information is not intended to replace advice given to you by your health care provider. Make sure you discuss any questions you have with your health care provider.  Document Released: 12/18/2006 Document Revised: 08/13/2017 Document Reviewed: 10/12/2016  [employer] Interactive Patient Education © 2018 [employer]  Acute Bronchitis, Adult  Acute bronchitis is sudden (acute) swelling of the air tubes (bronchi) in the lungs. Acute bronchitis causes these tubes to fill with mucus, which can make it hard to breathe. It can also cause coughing or wheezing.  In adults, acute bronchitis usually goes away within 2 weeks. A cough caused by bronchitis may last up to 3 weeks. Smoking, allergies, and asthma can make the condition worse. Repeated episodes of bronchitis may cause further lung problems, such as chronic obstructive pulmonary disease (COPD).  What are the causes?  This condition can be caused by germs and by substances that irritate the lungs, including:  · Cold and flu viruses. This condition is most often caused by the same virus that causes a cold.  · Bacteria.  · Exposure to tobacco smoke, dust, fumes, and air pollution.    What increases the risk?  This condition is more likely to develop in people who:  · Have close contact with someone with acute bronchitis.  · Are exposed to lung irritants, such as tobacco smoke, dust, fumes, and vapors.  · Have a weak immune system.  · Have a respiratory condition such as asthma.    What are the signs or symptoms?  Symptoms of this condition include:  · A cough.  · Coughing up clear, yellow, or green mucus.  · Wheezing.  · Chest congestion.  · Shortness of breath.  · A fever.  · Body aches.  · Chills.  · A sore throat.    How is this diagnosed?  This condition is usually diagnosed with a physical exam. During the exam, your health care provider may order  tests, such as chest X-rays, to rule out other conditions. He or she may also:  · Test a sample of your mucus for bacterial infection.  · Check the level of oxygen in your blood. This is done to check for pneumonia.  · Do a chest X-ray or lung function testing to rule out pneumonia and other conditions.  · Perform blood tests.    Your health care provider will also ask about your symptoms and medical history.  How is this treated?  Most cases of acute bronchitis clear up over time without treatment. Your health care provider may recommend:  · Drinking more fluids. Drinking more makes your mucus thinner, which may make it easier to breathe.  · Taking a medicine for a fever or cough.  · Taking an antibiotic medicine.  · Using an inhaler to help improve shortness of breath and to control a cough.  · Using a cool mist vaporizer or humidifier to make it easier to breathe.    Follow these instructions at home:  Medicines  · Take over-the-counter and prescription medicines only as told by your health care provider.  · If you were prescribed an antibiotic, take it as told by your health care provider. Do not stop taking the antibiotic even if you start to feel better.  General instructions  · Get plenty of rest.  · Drink enough fluids to keep your urine clear or pale yellow.  · Avoid smoking and secondhand smoke. Exposure to cigarette smoke or irritating chemicals will make bronchitis worse. If you smoke and you need help quitting, ask your health care provider. Quitting smoking will help your lungs heal faster.  · Use an inhaler, cool mist vaporizer, or humidifier as told by your health care provider.  · Keep all follow-up visits as told by your health care provider. This is important.  How is this prevented?  To lower your risk of getting this condition again:  · Wash your hands often with soap and water. If soap and water are not available, use hand .  · Avoid contact with people who have cold symptoms.  · Try  not to touch your hands to your mouth, nose, or eyes.  · Make sure to get the flu shot every year.    Contact a health care provider if:  · Your symptoms do not improve in 2 weeks of treatment.  Get help right away if:  · You cough up blood.  · You have chest pain.  · You have severe shortness of breath.  · You become dehydrated.  · You faint or keep feeling like you are going to faint.  · You keep vomiting.  · You have a severe headache.  · Your fever or chills gets worse.  This information is not intended to replace advice given to you by your health care provider. Make sure you discuss any questions you have with your health care provider.  Document Released: 01/25/2006 Document Revised: 07/12/2017 Document Reviewed: 06/07/2017  [employer] Interactive Patient Education © 2018 [employer]

## 2018-10-04 NOTE — PROGRESS NOTES
"Subjective   Ericka Kaiserager is a 48 y.o. female.     Patient reports having worsening cough x 2 weeks. She has also had some sinus congestion and PND. Pt mentions she needs \"nose surgery\" but prior to this she has some dental sx scheduled for next month since some of the roots of her maxillary teeth have grown into her sinus cavities.       Cough   This is a new problem. Episode onset: 2 weeks ago. The problem has been gradually worsening. The problem occurs every few minutes. The cough is non-productive. Associated symptoms include chills, myalgias, nasal congestion, postnasal drip, rhinorrhea, a sore throat, shortness of breath and wheezing. Pertinent negatives include no chest pain, ear congestion, ear pain, eye redness, fever, headaches, heartburn, hemoptysis or sweats. The symptoms are aggravated by exercise. Risk factors for lung disease include smoking/tobacco exposure (1ppd smoker x 32 years ). Treatments tried: ibuprofen Qhs, albuterol 2-3 times per day. The treatment provided mild relief. Her past medical history is significant for asthma (allergy induced-normally doesn't require inhaler use), bronchitis and environmental allergies. There is no history of pneumonia.       The following portions of the patient's history were reviewed and updated as appropriate: allergies, current medications, past medical history, past social history, past surgical history and problem list.    Review of Systems   Constitutional: Positive for chills and fatigue. Negative for appetite change, diaphoresis and fever.   HENT: Positive for congestion, postnasal drip, rhinorrhea, sinus pressure and sore throat. Negative for ear discharge, ear pain, facial swelling, mouth sores, nosebleeds, sinus pain, sneezing, trouble swallowing and voice change.    Eyes: Negative for redness and itching.   Respiratory: Positive for cough, chest tightness, shortness of breath and wheezing. Negative for hemoptysis and stridor.  "   Cardiovascular: Negative for chest pain.   Gastrointestinal: Negative for diarrhea, heartburn, nausea and vomiting.   Musculoskeletal: Positive for myalgias. Negative for neck pain and neck stiffness.   Skin: Negative for color change.   Allergic/Immunologic: Positive for environmental allergies.   Neurological: Negative for dizziness and headaches.       Objective   Physical Exam   Constitutional: She appears well-developed and well-nourished. She is cooperative.  Non-toxic appearance. She does not appear ill. No distress.   HENT:   Right Ear: Hearing, tympanic membrane, external ear and ear canal normal.   Left Ear: Hearing, tympanic membrane, external ear and ear canal normal.   Nose: Mucosal edema present. Right sinus exhibits maxillary sinus tenderness. Right sinus exhibits no frontal sinus tenderness. Left sinus exhibits maxillary sinus tenderness. Left sinus exhibits no frontal sinus tenderness.   Mouth/Throat: Uvula is midline and mucous membranes are normal. No oral lesions. No uvula swelling. Posterior oropharyngeal erythema present. No oropharyngeal exudate or posterior oropharyngeal edema. Tonsils are 2+ on the right. Tonsils are 2+ on the left. No tonsillar exudate.   Eyes: Conjunctivae and lids are normal.   Cardiovascular: Normal rate, regular rhythm, S1 normal and S2 normal.    Pulmonary/Chest: Effort normal. She has no decreased breath sounds. She has wheezes in the right upper field, the right middle field, the right lower field, the left upper field, the left middle field and the left lower field. She has rhonchi in the right upper field. She has no rales.   Coughing with deep breathing exercises      Abdominal: Bowel sounds are normal. There is no tenderness.   Lymphadenopathy:     She has no cervical adenopathy.   Skin: Skin is warm and dry. She is not diaphoretic. No pallor.   Vitals reviewed.      Assessment/Plan   Ericka was seen today for cough.    Diagnoses and all orders for this  visit:    Bronchitis  -     predniSONE (DELTASONE) 20 MG tablet; Take 1 tablet by mouth 2 (Two) Times a Day for 5 days.  -     doxycycline (VIBRAMYICN) 100 MG tablet; Take 1 tablet by mouth 2 (Two) Times a Day for 7 days.  -     albuterol (PROVENTIL HFA;VENTOLIN HFA) 108 (90 Base) MCG/ACT inhaler; Inhale 2 puffs Every 4 (Four) Hours As Needed for Wheezing or Shortness of Air for up to 30 days.    Maxillary sinusitis, unspecified chronicity  -     predniSONE (DELTASONE) 20 MG tablet; Take 1 tablet by mouth 2 (Two) Times a Day for 5 days.  -     doxycycline (VIBRAMYICN) 100 MG tablet; Take 1 tablet by mouth 2 (Two) Times a Day for 7 days.          -     Pt declined nebulizer in clinic today-in past these have made her dizzy and she has driven herself today        -    Discussed usefulness of mucinex OTC for congestion relief        -    Instructed to use albuterol inhaler 2 puffs Q4 hours while awake x 3 days then resume PRN frequency        -    Smoking cessation-not interested at this time        -    Follow up with PCP for persistent symptoms or UC/ER for worsening symptoms

## 2018-10-10 ENCOUNTER — TRANSCRIBE ORDERS (OUTPATIENT)
Dept: ADMINISTRATIVE | Facility: HOSPITAL | Age: 48
End: 2018-10-10

## 2018-10-10 DIAGNOSIS — R20.2 PARESTHESIA: Primary | ICD-10-CM

## 2018-11-20 ENCOUNTER — TRANSCRIBE ORDERS (OUTPATIENT)
Dept: ADMINISTRATIVE | Facility: HOSPITAL | Age: 48
End: 2018-11-20

## 2018-11-20 DIAGNOSIS — M54.12 CERVICAL RADICULOPATHY: ICD-10-CM

## 2018-11-20 DIAGNOSIS — M50.30 DDD (DEGENERATIVE DISC DISEASE), CERVICAL: ICD-10-CM

## 2018-11-20 DIAGNOSIS — M54.16 LUMBAR RADICULOPATHY: ICD-10-CM

## 2018-11-20 DIAGNOSIS — M51.36 DDD (DEGENERATIVE DISC DISEASE), LUMBAR: Primary | ICD-10-CM

## 2018-12-12 ENCOUNTER — HOSPITAL ENCOUNTER (OUTPATIENT)
Dept: INFUSION THERAPY | Facility: HOSPITAL | Age: 48
Discharge: HOME | End: 2018-12-12
Attending: PSYCHIATRY & NEUROLOGY | Admitting: PSYCHIATRY & NEUROLOGY

## 2018-12-12 DIAGNOSIS — M54.12 CERVICAL RADICULOPATHY: ICD-10-CM

## 2018-12-12 DIAGNOSIS — M50.30 DDD (DEGENERATIVE DISC DISEASE), CERVICAL: ICD-10-CM

## 2018-12-12 DIAGNOSIS — M51.36 DDD (DEGENERATIVE DISC DISEASE), LUMBAR: ICD-10-CM

## 2018-12-12 DIAGNOSIS — M54.16 LUMBAR RADICULOPATHY: ICD-10-CM

## 2018-12-12 PROCEDURE — 95913 NRV CNDJ TEST 13/> STUDIES: CPT | Performed by: PSYCHIATRY & NEUROLOGY

## 2018-12-12 PROCEDURE — 95886 MUSC TEST DONE W/N TEST COMP: CPT

## 2018-12-12 PROCEDURE — 95913 NRV CNDJ TEST 13/> STUDIES: CPT

## 2018-12-12 PROCEDURE — 95886 MUSC TEST DONE W/N TEST COMP: CPT | Performed by: PSYCHIATRY & NEUROLOGY
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
## 2019-01-19 ENCOUNTER — OFFICE VISIT (OUTPATIENT)
Dept: RETAIL CLINIC | Facility: CLINIC | Age: 49
End: 2019-01-19

## 2019-01-19 VITALS
RESPIRATION RATE: 16 BRPM | SYSTOLIC BLOOD PRESSURE: 110 MMHG | DIASTOLIC BLOOD PRESSURE: 64 MMHG | HEART RATE: 79 BPM | TEMPERATURE: 98.3 F | OXYGEN SATURATION: 96 %

## 2019-01-19 DIAGNOSIS — R60.0 BILATERAL LOWER EXTREMITY EDEMA: ICD-10-CM

## 2019-01-19 DIAGNOSIS — J01.01 ACUTE RECURRENT MAXILLARY SINUSITIS: Primary | ICD-10-CM

## 2019-01-19 PROCEDURE — 99214 OFFICE O/P EST MOD 30 MIN: CPT | Performed by: NURSE PRACTITIONER

## 2019-01-19 RX ORDER — DOXYCYCLINE HYCLATE 100 MG/1
100 CAPSULE ORAL 2 TIMES DAILY
Qty: 28 CAPSULE | Refills: 0 | Status: SHIPPED | OUTPATIENT
Start: 2019-01-19 | End: 2019-02-02

## 2019-01-19 RX ORDER — ALBUTEROL SULFATE 90 UG/1
AEROSOL, METERED RESPIRATORY (INHALATION)
COMMUNITY
Start: 2018-10-04 | End: 2022-06-21

## 2019-01-19 RX ORDER — FLUTICASONE PROPIONATE 50 MCG
2 SPRAY, SUSPENSION (ML) NASAL DAILY
Qty: 1 BOTTLE | Refills: 1 | OUTPATIENT
Start: 2019-01-19 | End: 2019-04-20

## 2019-01-19 NOTE — PROGRESS NOTES
"[facility]    CC:   Chief Complaint   Patient presents with   • Sinus Problem     c/o a hx of sinus perforation, and a tooth that was pulled. Pt reports getting frequent sinus infections on one side. Pt says she has a distinct odor, green discharge coming out of her left side. She says her jaw is swollen, facial swelling, and trouble chewing. She reports these symptoms started last Monday.   • Fever     Pt reports not measuring her temperature but says she can tell she has a temperature. She reports having chils and fatigue at night. Pt has been taking Mucinex and Motrin otc.    • Leg Swelling     c/o having leg and feet swelling that started two weeks ago. Pt says that it comes and goes. She says that she did have a PAD test done that came back as negative. She also says she's seen a cardioligist which determined her heart was done. She is awaiting further testing for a blood clot and blockages. She is wanting an opinion on this today.        HPI   48 YOF presents c/o 1 week of increasing severe left maxillary sinus pain, left jaw/left facial edema  +fever/chills at night/coughing/green nasal mucus/bad odor to nasal mucus (\"smells like dog poop\")  Some dizziness with bending  Similar to episode 4 years ago requiring multiple antibiotics  Using mucinex/motrin, no help  Seen by oral surgeons to repair dental/jaw issue on left  No known sick contacts    Bilateral LE edema off and on for several months after gum infection  Started after abx then went away after infection went away  Las abx Azithromycin 12/25, edema resolved while on abx  No claudication/calf pain/redness/itchiness  No n/v/d/soa/chest pain/abdominal pain    Review of Systems: Please see the above history of present illness for pertinent positives and negatives. The remainder of the patient's systems have been reviewed and are negative.     Past Medical History:   Diagnosis Date   • Abnormal Pap smear of cervix     1 abnormal with normal f/u "   • Asthma    • DDD (degenerative disc disease), cervical    • DDD (degenerative disc disease), lumbar    • Depression    • Herpes     HSV 1 +   • Schizoaffective disorder (CMS/HCC)        Past Surgical History:   Procedure Laterality Date   • CHOLECYSTECTOMY     • CYST REMOVAL      chin and knee       Social History     Tobacco Use   • Smoking status: Current Every Day Smoker     Packs/day: 0.50     Years: 32.00     Pack years: 16.00     Types: Cigarettes   • Smokeless tobacco: Never Used   • Tobacco comment: Hx of smoking 1ppd. She reports doing a 1/2 ppd for the last two months. Noted--01/19/2019   Substance Use Topics   • Alcohol use: No     Comment: occasional    • Drug use: No         Current Outpatient Medications:   •  albuterol sulfate  (90 Base) MCG/ACT inhaler, Inhale., Disp: , Rfl:   •  doxycycline (VIBRAMYCIN) 100 MG capsule, Take 1 capsule by mouth 2 (Two) Times a Day for 14 days., Disp: 28 capsule, Rfl: 0  •  fluticasone (FLONASE) 50 MCG/ACT nasal spray, 2 sprays into the nostril(s) as directed by provider Daily., Disp: 1 bottle, Rfl: 1    Allergies   Allergen Reactions   • Geodon [Ziprasidone Hcl] Swelling     Throat swelling, trouble swallowing food.   • Cephalosporins Swelling and Rash   • Penicillins Rash   • Sulfa Antibiotics Swelling and Rash       OBJECTIVE:    /64 (BP Location: Left arm, Patient Position: Sitting, Cuff Size: Adult)   Pulse 79   Temp 98.3 °F (36.8 °C) (Oral)   Resp 16   LMP 12/27/2018 (Approximate)   SpO2 96%     Lab Results (last 24 hours)     ** No results found for the last 24 hours. **          General Appearance:    Alert, cooperative, no distress, appears stated age   Head:    Normocephalic, without obvious abnormality, atraumatic   Eyes:    PERRL, conjunctiva/corneas clear, EOM's intact, fundi     benign, both eyes   Ears:   bilaterally large serous effusions, no erythema, bilateral canals unremarkable   Nose:   Nares normal, septum midline, mucosa  normal, no drainage     +moderate left maxillary sinus tenderness   Throat:   Lips, mucosa, and tongue normal; teeth and gums normal  Tonsils without erythema or exudates.   Neck:   Supple, symmetrical, trachea midline, no adenopathy;     FACE: left maxillary sinus area facial edema/left mild jaw area edema, +left maxillary sinus pain-moderately severe           Lungs:     Clear to auscultation bilaterally, respirations unlabored   Chest Wall:    No tenderness or deformity    Heart:    Regular rate and rhythm, S1 and S2 normal, no murmur, rub    or gallop    Legs: bilateral LE 1+ pitting edema knee to foot, negative tatyana's bilaterally, non-tender, no erythema                                       ASSESSMENT/PLAN    1. Acute recurrent maxillary sinusitis    - doxycycline (VIBRAMYCIN) 100 MG capsule; Take 1 capsule by mouth 2 (Two) Times a Day for 14 days.  Dispense: 28 capsule; Refill: 0  - fluticasone (FLONASE) 50 MCG/ACT nasal spray; 2 sprays into the nostril(s) as directed by provider Daily.  Dispense: 1 bottle; Refill: 1    2. Bilateral LE edema: chronic    Already has full workup ongoing. Venous duplex and carotid duplex pending approval to have studies done. Seen by cardiology with thorough workup, cleared.   F/U Mattie Stout APRN for continued monitoring      ISABELL MTZ had no medications administered during this visit.      AVS and information sheet given to patient, discussed in detail, questions answered

## 2019-04-20 PROBLEM — J32.0 CHRONIC MAXILLARY SINUSITIS: Status: ACTIVE | Noted: 2019-04-20

## 2019-11-07 ENCOUNTER — TELEPHONE (OUTPATIENT)
Dept: OBSTETRICS AND GYNECOLOGY | Facility: CLINIC | Age: 49
End: 2019-11-07

## 2019-11-07 NOTE — TELEPHONE ENCOUNTER
Pt called and stated she is feeling a lot of pain and pressure in he cervix area and her left hip feels numb on the surface of the skin. May I overbook her to be seen with you? Thanks

## 2020-12-22 ENCOUNTER — TELEPHONE (OUTPATIENT)
Dept: OBSTETRICS AND GYNECOLOGY | Facility: CLINIC | Age: 50
End: 2020-12-22

## 2020-12-22 NOTE — TELEPHONE ENCOUNTER
Patient is experiencing weight loss, cramping, and her cycles have stopped since July and she states she is not pregnant and wants to know if she can be seen before march at either office?

## 2020-12-23 NOTE — TELEPHONE ENCOUNTER
Can you look at Dr. Alcala schedule at either office in January and let me know when would be a good time to offer the patient an appointment?

## 2020-12-23 NOTE — TELEPHONE ENCOUNTER
Yes, she can be seen in [place] for US and visit, however, she has a habit of making appts and not showing up for them, so if she N/S this urgent appointment, she will go to the back of the line to reschedule it.

## 2021-01-13 ENCOUNTER — OFFICE VISIT (OUTPATIENT)
Dept: OBSTETRICS AND GYNECOLOGY | Facility: CLINIC | Age: 51
End: 2021-01-13

## 2021-01-13 ENCOUNTER — TELEPHONE (OUTPATIENT)
Dept: GASTROENTEROLOGY | Facility: CLINIC | Age: 51
End: 2021-01-13

## 2021-01-13 VITALS
BODY MASS INDEX: 20.33 KG/M2 | WEIGHT: 122 LBS | DIASTOLIC BLOOD PRESSURE: 62 MMHG | SYSTOLIC BLOOD PRESSURE: 112 MMHG | HEIGHT: 65 IN

## 2021-01-13 DIAGNOSIS — R41.3 MEMORY CHANGE: ICD-10-CM

## 2021-01-13 DIAGNOSIS — F17.200 SMOKER: ICD-10-CM

## 2021-01-13 DIAGNOSIS — Z13.9 SCREENING FOR UNSPECIFIED CONDITION: Primary | ICD-10-CM

## 2021-01-13 DIAGNOSIS — Z11.3 SCREENING FOR STDS (SEXUALLY TRANSMITTED DISEASES): ICD-10-CM

## 2021-01-13 DIAGNOSIS — R10.2 PELVIC CRAMPING: ICD-10-CM

## 2021-01-13 DIAGNOSIS — N95.1 PERIMENOPAUSAL: ICD-10-CM

## 2021-01-13 DIAGNOSIS — Z71.6 ENCOUNTER FOR SMOKING CESSATION COUNSELING: ICD-10-CM

## 2021-01-13 PROCEDURE — 99406 BEHAV CHNG SMOKING 3-10 MIN: CPT | Performed by: OBSTETRICS & GYNECOLOGY

## 2021-01-13 PROCEDURE — 99214 OFFICE O/P EST MOD 30 MIN: CPT | Performed by: OBSTETRICS & GYNECOLOGY

## 2021-01-13 RX ORDER — VARENICLINE TARTRATE 1 MG/1
1 TABLET, FILM COATED ORAL 2 TIMES DAILY
Qty: 56 TABLET | Refills: 1 | Status: SHIPPED | OUTPATIENT
Start: 2021-02-10 | End: 2021-04-07

## 2021-01-13 NOTE — PROGRESS NOTES
"      ISABELL MTZ is a 50 y.o. patient who presents for follow up of   Chief Complaint   Patient presents with   • Follow-up     US       49 yo est pt here for issues with her cycles. She was last seen in 7/2018. She has not had a cycle in 4 months but does have cramping like she is about to start a cycle. This has been occurring for about the last year. Her cycles can be heavy when they occur but she is not weak or dizzy when they happen. She is a vegetarian. Her US today shows a 7.28 cm uterus with EL 0.9 cm and normal ovaries. There is no comparable US data. She is having some HF. She is requesting an STD check. She is also having some short term memory problems and is concerned about lead poisoning as she has renovated an old house and it had lead paint. She is smoking 1/2 PPD and is interested in quitting. We discussed Nicotine replacement and she has never had any benefit before and is interested in Chantix.       The following portions of the patient's history were reviewed and updated as appropriate: allergies, current medications and problem list.    Review of Systems   Constitutional: Positive for activity change and unexpected weight change.   Endocrine: Positive for heat intolerance.   Genitourinary: Positive for menstrual problem and pelvic pain.   Psychiatric/Behavioral: Positive for sleep disturbance.        + memory issues   All other systems reviewed and are negative.      /62   Ht 165.1 cm (65\")   Wt 55.3 kg (122 lb)   Breastfeeding No   BMI 20.30 kg/m²     Physical Exam  Vitals signs and nursing note reviewed.   Constitutional:       Appearance: Normal appearance. She is well-developed and normal weight.   HENT:      Head: Normocephalic and atraumatic.   Eyes:      General: No scleral icterus.     Conjunctiva/sclera: Conjunctivae normal.   Neck:      Thyroid: No thyromegaly.   Abdominal:      General: There is no distension.      Palpations: Abdomen is soft. There is no mass.     " " Tenderness: There is no abdominal tenderness. There is no guarding or rebound.      Hernia: No hernia is present.   Skin:     General: Skin is warm and dry.   Neurological:      Mental Status: She is alert and oriented to person, place, and time.   Psychiatric:         Mood and Affect: Mood normal.         Behavior: Behavior normal.         Thought Content: Thought content normal.         Judgment: Judgment normal.         A/P:  1. Pelvic cramping- normal pelvic US. We discussed that she is not likely done with her cycles and that this cramping may be indicative of an upcoming cycle.   2. Perimenopause- Menopause is one whole year without a menstrual cycle in the correct age individual.  The \"perimenopause\" refers to hormonal changes and symptoms that may occur in the 5 to 10 years prior to cycles actually stopping.  The symptoms may include hot flashes, night sweats, insomnia or altered quality of sleep, moodiness, irritability, weight gain, hair loss or growth, libido changes as well as changes in the length and severity of menstrual bleeding.  Any vaginal bleeding that last longer than 10 days, any cycles that are closer together than 21 days or any cycles that are very heavy should prompt an appointment for evaluation. At this point, she does not feel that she needs treatment for her symptoms, she is reassured.  3. Check STD panel per pt request. Enc condoms  4. Memory changes- check lead level. If normal, enc pt to f/u with her primary care MD for further w/u  5.Smoker- .ISABELL SHAH SMITH  reports that she has been smoking cigarettes. She has a 16.00 pack-year smoking history. She has never used smokeless tobacco.. I have educated her on the risk of diseases from using tobacco products such as cancer, COPD, heart disease and cataracts.   I advised her to quit and she is willing to quit. We have discussed the following method/s for tobacco cessation:  Education Material Counseling Prescription Medicaiton.  " Together we have set a quit date for 1 month from today.  She will follow up with me in 1 month or sooner to check on her progress.  I spent 3.5 minutes counseling the patient. Rx Chantix and side effect profile d/w pt.   6. Schedule MMG  7. Refer C scope EP  8. RTO 1 month annual and recheck smoking cessation progress.              Assessment/Plan   Diagnoses and all orders for this visit:    1. Screening for unspecified condition (Primary)  -     POC Urinalysis Dipstick  -     Lead, Blood  -     Mammo Screening Bilateral With CAD; Future  -     Ambulatory Referral For Screening Colonoscopy    2. Screening for STDs (sexually transmitted diseases)  -     Hepatitis B Surface Antigen  -     Hepatitis C Antibody  -     HIV-1 / O / 2 Ag / Antibody 4th Generation  -     HSV 1 & 2 - Specific Antibody, IgG  -     RPR, Rfx Qn RPR / Confirm TP  -     Chlamydia trachomatis, Neisseria gonorrhoeae, Trichomonas vaginalis, PCR - Urine, Urine, Random Void    3. Perimenopausal    4. Pelvic cramping    5. Memory change    6. Encounter for smoking cessation counseling    Other orders  -     varenicline (CHANTIX KENDRICK) 0.5 MG X 11 & 1 MG X 42 tablet; Take 0.5 mg po daily x 3 days, then 0.5 mg po bid x 4 days, then 1 mg po bid  Dispense: 53 tablet; Refill: 0  -     varenicline (Chantix Continuing Month Kendrick) 1 MG tablet; Take 1 tablet by mouth 2 (Two) Times a Day for 56 days.  Dispense: 56 tablet; Refill: 1                 No follow-ups on file.      Radha Manzanares MD    1/13/2021  09:27 EST

## 2021-01-14 LAB
C TRACH RRNA SPEC QL NAA+PROBE: NEGATIVE
HBV SURFACE AG SERPL QL IA: NEGATIVE
HCV AB S/CO SERPL IA: <0.1 S/CO RATIO (ref 0–0.9)
HIV 1+2 AB+HIV1 P24 AG SERPL QL IA: NON REACTIVE
HSV1 IGG SER IA-ACNC: 27.8 INDEX (ref 0–0.9)
HSV2 IGG SER IA-ACNC: <0.91 INDEX (ref 0–0.9)
N GONORRHOEA RRNA SPEC QL NAA+PROBE: NEGATIVE
RPR SER QL: NON REACTIVE
T VAGINALIS DNA SPEC QL NAA+PROBE: NEGATIVE

## 2021-01-18 LAB — LEAD BLDV-MCNC: 2 UG/DL (ref 0–4)

## 2021-02-10 ENCOUNTER — OFFICE VISIT (OUTPATIENT)
Dept: OBSTETRICS AND GYNECOLOGY | Facility: CLINIC | Age: 51
End: 2021-02-10

## 2021-02-10 VITALS
SYSTOLIC BLOOD PRESSURE: 108 MMHG | HEIGHT: 65 IN | WEIGHT: 122 LBS | DIASTOLIC BLOOD PRESSURE: 76 MMHG | BODY MASS INDEX: 20.33 KG/M2

## 2021-02-10 DIAGNOSIS — Z11.51 SPECIAL SCREENING EXAMINATION FOR HUMAN PAPILLOMAVIRUS (HPV): ICD-10-CM

## 2021-02-10 DIAGNOSIS — Z01.419 ROUTINE GYNECOLOGICAL EXAMINATION: ICD-10-CM

## 2021-02-10 DIAGNOSIS — Z01.419 PAP SMEAR, LOW-RISK: Primary | ICD-10-CM

## 2021-02-10 DIAGNOSIS — Z13.9 SCREENING FOR CONDITION: ICD-10-CM

## 2021-02-10 LAB
BILIRUB BLD-MCNC: NEGATIVE MG/DL
CLARITY, POC: CLEAR
COLOR UR: YELLOW
GLUCOSE UR STRIP-MCNC: NEGATIVE MG/DL
KETONES UR QL: NEGATIVE
LEUKOCYTE EST, POC: NEGATIVE
NITRITE UR-MCNC: NEGATIVE MG/ML
PH UR: 6 [PH] (ref 5–8)
PROT UR STRIP-MCNC: NEGATIVE MG/DL
RBC # UR STRIP: NEGATIVE /UL
SP GR UR: 1.03 (ref 1–1.03)
UROBILINOGEN UR QL: NORMAL

## 2021-02-10 PROCEDURE — 99396 PREV VISIT EST AGE 40-64: CPT | Performed by: OBSTETRICS & GYNECOLOGY

## 2021-02-10 NOTE — PROGRESS NOTES
GYN Annual Exam     CC- Here for annual exam.     ISABELL MTZ is a 50 y.o. female est pt  who presents for annual well woman exam and to f/u on Chantix. . Her cycles are irregular. She skipped 5 months of her cycle but had one last week. She is concerned about a cyst in her labia. She had a negative STD panel in 2021 and is asking to be rechecked for STDs in 3 months. She is using Chantix and has decreased the amount she smokes but is still smoking.     OB History        1    Para   1    Term   1            AB        Living   1       SAB        TAB        Ectopic        Molar        Multiple        Live Births              Obstetric Comments   1              Menarche: 16  Current contraception: vasectomy  History of abnormal Pap smear: yes - 1 abnl with nl f/u  History of abnormal mammogram: no  Family history of uterine, colon or ovarian cancer: no  Family history of breast cancer: no  H/o STDs: none  Last pap: 2017- nl pap/HPV  MARCELLE: sister x 2, uncle Protein S deficiency, pt is negative    Health Maintenance   Topic Date Due   • COLONOSCOPY  1972   • ANNUAL PHYSICAL  1975   • Pneumococcal Vaccine 0-64 (1 of 1 - PPSV23) 1978   • TDAP/TD VACCINES (1 - Tdap) 1991   • Annual Gynecologic Pelvic and Breast Exam  07/10/2019   • LIPID PANEL  10/05/2019   • PAP SMEAR  2020   • INFLUENZA VACCINE  2020   • HEPATITIS C SCREENING  Completed   • MENINGOCOCCAL VACCINE  Aged Out       Past Medical History:   Diagnosis Date   • Abnormal Pap smear of cervix     1 abnormal with normal f/u   • Asthma    • DDD (degenerative disc disease), cervical    • DDD (degenerative disc disease), lumbar    • Depression    • Herpes     HSV 1 +   • Schizoaffective disorder (CMS/HCC)        Past Surgical History:   Procedure Laterality Date   • CHOLECYSTECTOMY     • CYST REMOVAL      chin and knee         Current Outpatient Medications:   •  albuterol sulfate  (90 Base) MCG/ACT  inhaler, Inhale., Disp: , Rfl:   •  guaiFENesin (MUCINEX) 600 MG 12 hr tablet, Take 1,200 mg by mouth 2 (Two) Times a Day., Disp: , Rfl:   •  ibuprofen (ADVIL,MOTRIN) 200 MG tablet, Take 200 mg by mouth Every 6 (Six) Hours As Needed for Mild Pain ., Disp: , Rfl:   •  levoFLOXacin (LEVAQUIN) 500 MG tablet, Take 1 tablet by mouth Daily., Disp: 7 tablet, Rfl: 0  •  varenicline (Chantix Continuing Month Lamont) 1 MG tablet, Take 1 tablet by mouth 2 (Two) Times a Day for 56 days., Disp: 56 tablet, Rfl: 1    Allergies   Allergen Reactions   • Geodon [Ziprasidone Hcl] Swelling     Throat swelling, trouble swallowing food.   • Cephalosporins Swelling and Rash   • Penicillins Rash   • Sulfa Antibiotics Swelling and Rash       Social History     Tobacco Use   • Smoking status: Current Every Day Smoker     Packs/day: 0.50     Years: 32.00     Pack years: 16.00     Types: Cigarettes   • Smokeless tobacco: Never Used   • Tobacco comment: Hx of smoking 1ppd. She reports doing a 1/2 ppd for the last two months. Noted--01/19/2019   Substance Use Topics   • Alcohol use: No     Comment: occasional    • Drug use: No       Family History   Problem Relation Age of Onset   • Heart disease Mother    • Heart disease Father    • Protein S deficiency Sister         pt is neg   • Protein S deficiency Maternal Uncle    • Breast cancer Neg Hx    • Ovarian cancer Neg Hx    • Colon cancer Neg Hx    NO B/O/C    Review of Systems   Constitutional: Positive for activity change. Negative for appetite change, fatigue, fever and unexpected weight change.        + insomnia   Respiratory: Negative for cough and shortness of breath.    Cardiovascular: Negative for chest pain and palpitations.   Gastrointestinal: Negative for abdominal distention, abdominal pain, constipation, diarrhea and nausea.   Genitourinary: Positive for genital sores. Negative for dyspareunia, dysuria, menstrual problem, pelvic pain, vaginal bleeding and vaginal discharge.   Skin:  "Negative for color change and rash.   Neurological: Negative for headaches.   Psychiatric/Behavioral: Negative for dysphoric mood. The patient is not nervous/anxious.        /76   Ht 165.1 cm (65\")   Wt 55.3 kg (122 lb)   BMI 20.30 kg/m²     Physical Exam   Constitutional: She is oriented to person, place, and time. She appears well-developed.   HENT:   Head: Normocephalic and atraumatic.   Neck: No thyromegaly present.   Cardiovascular: Normal rate and regular rhythm.   Pulmonary/Chest: Effort normal and breath sounds normal. Right breast exhibits no inverted nipple, no mass, no nipple discharge, no skin change and no tenderness. Left breast exhibits no inverted nipple, no mass, no nipple discharge, no skin change and no tenderness. No breast swelling, tenderness, discharge or bleeding. Breasts are symmetrical.   Coarse breath sounds   Abdominal: Soft. Normal appearance and bowel sounds are normal. She exhibits no distension and no mass. There is no abdominal tenderness. No hernia.   Genitourinary:       No breast swelling, tenderness, discharge or bleeding.    Pelvic exam was performed with patient supine.   There is no rash, tenderness or lesion on the right labia. There is lesion on the left labia. There is no rash or tenderness on the left labia. Uterus is enlarged (10 cms). Uterus is not deviated, not fixed and not tender. Cervix exhibits no motion tenderness, no discharge and no friability. Right adnexum displays no mass, no tenderness and no fullness. Left adnexum displays no mass, no tenderness and no fullness.    No vaginal discharge, erythema, tenderness or bleeding.   No erythema, tenderness or bleeding in the vagina.    No foreign body in the vagina.      No signs of injury in the vagina.     Neurological: She is alert and oriented to person, place, and time.   Skin: Skin is warm and dry.   Psychiatric: Her behavior is normal. Judgment and thought content normal.   Nursing note and vitals " reviewed.         Assessment/Plan    1) GYN HM: check pap/HPV   SBE demonstrated and encouraged.  2) STD screening: declines now but wants it rechecked in 3 months, will make lab appt. Condoms encouraged.  3) Contraception: s/p vas  4) Family Planning: no plans  5) Diet and Exercise discussed  6) Smoking Status: ISABELL MTZ  reports that she has been smoking cigarettes. She has a 16.00 pack-year smoking history. She has never used smokeless tobacco.. I have educated her on the risk of diseases from using tobacco products such as cancer, COPD, heart disease and cataracts. I advised her to quit and she is not willing to quit.  I spent 3  minutes counseling the patient. She is using Chantix and finds it helpful but is not ready to stop.  7) L labial cyst- advised pt that this a benign finding and that it can be removed in the office if bothersome, however, it can be a painful procedure and she declines for now.   8) MMG- schedule.  9)Cscope- pt has info and will schedule herself  10) Parts of this document have been copied or forwarded from her previous visits and have been reviewed, updated and edited as indicated.   11)I saw the patient with a face mask, gloves and eye protection  The patient herself was masked.  Social distancing was observed as appropriate.  12)Follow up prn or 1 year       Diagnoses and all orders for this visit:    1. Pap smear, low-risk (Primary)  -     IgP, Aptima HPV    2. Screening for condition  -     POC Urinalysis Dipstick  -     Mammo Screening Bilateral With CAD; Future    3. Special screening examination for human papillomavirus (HPV)  -     IgP, Aptima HPV    4. Routine gynecological examination  -     IgP, Aptima HPV          Radha Manzanares MD  2/10/2021  18:00 EST

## 2021-02-12 LAB
CYTOLOGIST CVX/VAG CYTO: NORMAL
CYTOLOGY CVX/VAG DOC CYTO: NORMAL
CYTOLOGY CVX/VAG DOC THIN PREP: NORMAL
DX ICD CODE: NORMAL
HIV 1 & 2 AB SER-IMP: NORMAL
HPV I/H RISK 4 DNA CVX QL PROBE+SIG AMP: NEGATIVE
OTHER STN SPEC: NORMAL
STAT OF ADQ CVX/VAG CYTO-IMP: NORMAL

## 2021-02-20 ENCOUNTER — TRANSCRIBE ORDERS (OUTPATIENT)
Dept: OBSTETRICS AND GYNECOLOGY | Facility: CLINIC | Age: 51
End: 2021-02-20

## 2021-02-20 DIAGNOSIS — Z12.31 ENCOUNTER FOR SCREENING MAMMOGRAM FOR BREAST CANCER: Primary | ICD-10-CM

## 2021-02-26 ENCOUNTER — APPOINTMENT (OUTPATIENT)
Dept: MAMMOGRAPHY | Facility: HOSPITAL | Age: 51
End: 2021-02-26

## 2021-03-12 ENCOUNTER — HOSPITAL ENCOUNTER (OUTPATIENT)
Dept: MAMMOGRAPHY | Facility: HOSPITAL | Age: 51
Discharge: HOME | End: 2021-03-12
Admitting: OBSTETRICS & GYNECOLOGY

## 2021-03-12 DIAGNOSIS — Z12.31 ENCOUNTER FOR SCREENING MAMMOGRAM FOR BREAST CANCER: ICD-10-CM

## 2021-03-12 PROCEDURE — 77067 SCR MAMMO BI INCL CAD: CPT

## 2021-03-12 PROCEDURE — 77063 BREAST TOMOSYNTHESIS BI: CPT

## 2022-05-11 ENCOUNTER — OFFICE VISIT (OUTPATIENT)
Dept: OBSTETRICS AND GYNECOLOGY | Facility: CLINIC | Age: 52
End: 2022-05-11

## 2022-05-11 VITALS
BODY MASS INDEX: 20.49 KG/M2 | DIASTOLIC BLOOD PRESSURE: 62 MMHG | WEIGHT: 123 LBS | HEIGHT: 65 IN | SYSTOLIC BLOOD PRESSURE: 106 MMHG

## 2022-05-11 DIAGNOSIS — Z01.419 ROUTINE GYNECOLOGICAL EXAMINATION: Primary | ICD-10-CM

## 2022-05-11 DIAGNOSIS — Z11.3 SCREENING FOR STDS (SEXUALLY TRANSMITTED DISEASES): ICD-10-CM

## 2022-05-11 DIAGNOSIS — Z12.11 SCREENING FOR COLON CANCER: ICD-10-CM

## 2022-05-11 DIAGNOSIS — Z12.31 ENCOUNTER FOR SCREENING MAMMOGRAM FOR MALIGNANT NEOPLASM OF BREAST: ICD-10-CM

## 2022-05-11 PROCEDURE — 99396 PREV VISIT EST AGE 40-64: CPT | Performed by: OBSTETRICS & GYNECOLOGY

## 2022-05-11 PROCEDURE — 81002 URINALYSIS NONAUTO W/O SCOPE: CPT | Performed by: OBSTETRICS & GYNECOLOGY

## 2022-05-11 NOTE — PROGRESS NOTES
GYN Annual Exam     CC- Here for annual exam.     Ericka Goel is a 52 y.o. female est pt  who presents for annual well woman exam. She is smoking 1/2 PPD. She has not had a cycle in a long time but has not ever been > 1 year without a cycle. She feels like she is dehydrated at times and we discussed adequate hydration and that if she continues to feel this way after changing her drinking habits she should f/u with her primary care MD.      OB History        1    Para   1    Term   1            AB        Living   1       SAB        IAB        Ectopic        Molar        Multiple        Live Births              Obstetric Comments   1              Menarche: 16  Menopause: not yet  HRT: none  Current contraception: vasectomy  History of abnormal Pap smear: yes - 1 abnl with nl f/u  History of abnormal mammogram: no  Family history of uterine, colon or ovarian cancer: no  Family history of breast cancer: no  H/o STDs: none  Last pap: 2021- normal pap/HPV  MARCELLE: sister x 2, uncle Protein S deficiency, pt is negative    Health Maintenance   Topic Date Due   • COLORECTAL CANCER SCREENING  Never done   • ANNUAL PHYSICAL  Never done   • COVID-19 Vaccine (1) Never done   • Pneumococcal Vaccine 0-64 (1 - PCV) Never done   • TDAP/TD VACCINES (1 - Tdap) Never done   • Annual Gynecologic Pelvic and Breast Exam  2022   • ZOSTER VACCINE (1 of 2) Never done   • INFLUENZA VACCINE  2022   • LIPID PANEL  2023   • MAMMOGRAM  2023   • PAP SMEAR  02/10/2024   • HEPATITIS C SCREENING  Completed       Past Medical History:   Diagnosis Date   • Abnormal Pap smear of cervix     1 abnormal with normal f/u   • Asthma    • DDD (degenerative disc disease), cervical    • DDD (degenerative disc disease), lumbar    • Depression    • Herpes     HSV 1 +   • Schizoaffective disorder (HCC)        Past Surgical History:   Procedure Laterality Date   • CHOLECYSTECTOMY     • CYST REMOVAL      chin and knee          Current Outpatient Medications:   •  albuterol sulfate  (90 Base) MCG/ACT inhaler, Inhale., Disp: , Rfl:   •  guaiFENesin (MUCINEX) 600 MG 12 hr tablet, Take 1,200 mg by mouth 2 (Two) Times a Day., Disp: , Rfl:   •  ibuprofen (ADVIL,MOTRIN) 200 MG tablet, Take 200 mg by mouth Every 6 (Six) Hours As Needed for Mild Pain ., Disp: , Rfl:   •  levoFLOXacin (LEVAQUIN) 500 MG tablet, Take 1 tablet by mouth Daily., Disp: 7 tablet, Rfl: 0    Allergies   Allergen Reactions   • Geodon [Ziprasidone Hcl] Swelling     Throat swelling, trouble swallowing food.   • Cephalosporins Swelling and Rash   • Penicillins Rash   • Sulfa Antibiotics Swelling and Rash       Social History     Tobacco Use   • Smoking status: Current Every Day Smoker     Packs/day: 0.50     Years: 32.00     Pack years: 16.00     Types: Cigarettes   • Smokeless tobacco: Never Used   Substance Use Topics   • Alcohol use: No     Comment: occasional    • Drug use: No       Family History   Problem Relation Age of Onset   • Heart disease Mother    • Heart disease Father    • Protein S deficiency Sister         pt is neg   • Protein S deficiency Maternal Uncle    • Breast cancer Neg Hx    • Ovarian cancer Neg Hx    • Colon cancer Neg Hx    NO B/O/C    Review of Systems   Constitutional: Positive for activity change. Negative for appetite change, fatigue, fever and unexpected weight change.        + insomnia   Respiratory: Negative for cough and shortness of breath.    Cardiovascular: Negative for chest pain and palpitations.   Gastrointestinal: Negative for abdominal distention, abdominal pain, constipation, diarrhea and nausea.   Genitourinary: Negative for difficulty urinating, dyspareunia, dysuria, genital sores, menstrual problem, pelvic pain, vaginal bleeding and vaginal discharge.   Skin: Negative for color change and rash.   Neurological: Negative for headaches.   Psychiatric/Behavioral: Negative for dysphoric mood. The patient is not  "nervous/anxious.        /62   Ht 165.1 cm (65\")   Wt 55.8 kg (123 lb)   BMI 20.47 kg/m²     Physical Exam   Constitutional: She is oriented to person, place, and time. She appears well-developed.   HENT:   Head: Normocephalic and atraumatic.   Neck: No thyromegaly present.   Cardiovascular: Normal rate and regular rhythm.   Pulmonary/Chest: Effort normal and breath sounds normal. Right breast exhibits no inverted nipple, no mass, no nipple discharge, no skin change and no tenderness. Left breast exhibits no inverted nipple, no mass, no nipple discharge, no skin change and no tenderness. No breast swelling, tenderness, discharge or bleeding. Breasts are symmetrical.   Coarse breath sounds   Abdominal: Soft. Normal appearance and bowel sounds are normal. She exhibits no distension and no mass. There is no abdominal tenderness. No hernia.   Genitourinary:       No breast swelling, tenderness, discharge or bleeding.    Pelvic exam was performed with patient supine.   There is no rash, tenderness or lesion on the right labia. There is lesion on the left labia. There is no rash or tenderness on the left labia. Uterus is enlarged (10 cms). Uterus is not deviated, not fixed and not tender. Cervix exhibits no motion tenderness, no discharge and no friability. Right adnexum displays no mass, no tenderness and no fullness. Left adnexum displays no mass, no tenderness and no fullness.    No vaginal discharge, erythema, tenderness or bleeding.   No erythema, tenderness or bleeding in the vagina.    No foreign body in the vagina.      No signs of injury in the vagina.     Neurological: She is alert and oriented to person, place, and time.   Skin: Skin is warm and dry.   Psychiatric: Her behavior is normal. Mood, judgment and thought content normal.   Nursing note and vitals reviewed.         Assessment/Plan    1) GYN HM: normal pap/HPV 2/2021  SBE demonstrated and encouraged.  2) STD screening: accepts  3) Contraception: " "s/p vas  4) Family Planning: no plans  5) Diet and Exercise discussed  6) Smoking Status: Ericka Goel  reports that she has been smoking cigarettes. She has a 16.00 pack-year smoking history. She has never used smokeless tobacco.. I have educated her on the risk of diseases from using tobacco products such as cancer, COPD, heart disease and cataracts. I advised her to quit and she is not willing to quit.I spent< 3  minutes counseling the patient. She is using Chantix and finds it helpful but is not ready to stop.  7) Menopause is one whole year without a menstrual cycle in the correct age individual.  The \"perimenopause\" refers to hormonal changes and symptoms that may occur in the 5 to 10 years prior to cycles actually stopping.  The symptoms may include hot flashes, night sweats, insomnia or altered quality of sleep, moodiness, irritability, weight gain, hair loss or growth, libido changes as well as changes in the length and severity of menstrual bleeding.  Any vaginal bleeding that last longer than 10 days, any cycles that are closer together than 21 days or any cycles that are very heavy should prompt an appointment for evaluation.  8) MMG- UTD 3/2021 B1,  Schedule MMG now.  9)Cscope- Refer EP  10) Parts of this document have been copied or forwarded from her previous visits and have been reviewed, updated and edited as indicated.   11)I saw the patient with a face mask, gloves and eye protection  The patient herself was masked.  Social distancing was observed as appropriate.  12)Follow up prn or 1 year annual        Diagnoses and all orders for this visit:    1. Routine gynecological examination (Primary)  -     POC Urinalysis Dipstick  -     Hepatitis B Surface Antigen  -     Hepatitis C Antibody  -     Chlamydia trachomatis, Neisseria gonorrhoeae, Trichomonas vaginalis, PCR - Urine, Urine, Clean Catch  -     HIV-1 / O / 2 Ag / Antibody 4th Generation  -     HSV 1 & 2 - Specific Antibody, IgG  -     " RPR, Rfx Qn RPR / Confirm TP    2. Screening for STDs (sexually transmitted diseases)  -     Hepatitis B Surface Antigen  -     Hepatitis C Antibody  -     Chlamydia trachomatis, Neisseria gonorrhoeae, Trichomonas vaginalis, PCR - Urine, Urine, Clean Catch  -     HIV-1 / O / 2 Ag / Antibody 4th Generation  -     HSV 1 & 2 - Specific Antibody, IgG  -     RPR, Rfx Qn RPR / Confirm TP    3. Screening for colon cancer  -     Ambulatory Referral For Screening Colonoscopy    4. Encounter for screening mammogram for malignant neoplasm of breast  -     Mammo Screening Bilateral With CAD; Future          Radha Mally Manzanares MD  5/11/2022  15:34 EDT

## 2022-05-12 LAB
HBV SURFACE AG SERPL QL IA: NEGATIVE
HCV AB S/CO SERPL IA: <0.1 S/CO RATIO (ref 0–0.9)
HIV 1+2 AB+HIV1 P24 AG SERPL QL IA: NON REACTIVE
HSV1 IGG SER IA-ACNC: 31.9 INDEX (ref 0–0.9)
HSV2 IGG SER IA-ACNC: <0.91 INDEX (ref 0–0.9)
RPR SER QL: NON REACTIVE

## 2022-05-13 ENCOUNTER — TRANSCRIBE ORDERS (OUTPATIENT)
Dept: ADMINISTRATIVE | Facility: HOSPITAL | Age: 52
End: 2022-05-13

## 2022-05-13 DIAGNOSIS — Z12.31 SCREENING MAMMOGRAM FOR HIGH-RISK PATIENT: Primary | ICD-10-CM

## 2022-05-16 ENCOUNTER — TRANSCRIBE ORDERS (OUTPATIENT)
Dept: ADMINISTRATIVE | Facility: HOSPITAL | Age: 52
End: 2022-05-16

## 2022-05-16 DIAGNOSIS — Z12.31 SCREENING MAMMOGRAM, ENCOUNTER FOR: Primary | ICD-10-CM

## 2022-05-17 ENCOUNTER — HOSPITAL ENCOUNTER (OUTPATIENT)
Dept: MAMMOGRAPHY | Facility: HOSPITAL | Age: 52
Discharge: HOME | End: 2022-05-17
Admitting: OBSTETRICS & GYNECOLOGY

## 2022-05-17 DIAGNOSIS — Z12.31 SCREENING MAMMOGRAM FOR HIGH-RISK PATIENT: ICD-10-CM

## 2022-05-17 PROCEDURE — 77063 BREAST TOMOSYNTHESIS BI: CPT

## 2022-05-17 PROCEDURE — 77067 SCR MAMMO BI INCL CAD: CPT

## 2022-05-25 ENCOUNTER — TELEPHONE (OUTPATIENT)
Dept: OBSTETRICS AND GYNECOLOGY | Facility: CLINIC | Age: 52
End: 2022-05-25

## 2022-05-25 ENCOUNTER — TELEPHONE (OUTPATIENT)
Dept: GASTROENTEROLOGY | Facility: CLINIC | Age: 52
End: 2022-05-25

## 2022-05-25 NOTE — TELEPHONE ENCOUNTER
Caller: ISABELL MTZ    Relationship: SELF    Best call back number: [phone]    Caller requesting test results: PAP RESULTS      When was the test performed: 5/11/22    Where was the test performed: [facility]    Additional notes:     ISABELL SMITH IS REQUESTING A CALL BACK TO GO OVER PAP RESULTS THAT WAS PERFORMED ON 5/11/22. PLEASE CALL PT BACK [phone]

## 2022-08-24 ENCOUNTER — OFFICE VISIT (OUTPATIENT)
Dept: FAMILY MEDICINE CLINIC | Facility: CLINIC | Age: 52
End: 2022-08-24

## 2022-08-24 VITALS
BODY MASS INDEX: 20.16 KG/M2 | DIASTOLIC BLOOD PRESSURE: 64 MMHG | HEIGHT: 65 IN | TEMPERATURE: 96.7 F | OXYGEN SATURATION: 94 % | HEART RATE: 93 BPM | SYSTOLIC BLOOD PRESSURE: 104 MMHG | RESPIRATION RATE: 18 BRPM | WEIGHT: 121 LBS

## 2022-08-24 DIAGNOSIS — R20.2 NUMBNESS AND TINGLING IN BOTH HANDS: ICD-10-CM

## 2022-08-24 DIAGNOSIS — R20.0 LOWER EXTREMITY NUMBNESS: ICD-10-CM

## 2022-08-24 DIAGNOSIS — Z11.3 ROUTINE SCREENING FOR STI (SEXUALLY TRANSMITTED INFECTION): ICD-10-CM

## 2022-08-24 DIAGNOSIS — J32.0 CHRONIC MAXILLARY SINUSITIS: ICD-10-CM

## 2022-08-24 DIAGNOSIS — Z00.00 ANNUAL PHYSICAL EXAM: Primary | ICD-10-CM

## 2022-08-24 DIAGNOSIS — R20.0 NUMBNESS AND TINGLING IN BOTH HANDS: ICD-10-CM

## 2022-08-24 DIAGNOSIS — E78.2 MIXED HYPERLIPIDEMIA: ICD-10-CM

## 2022-08-24 DIAGNOSIS — E55.9 VITAMIN D DEFICIENCY: ICD-10-CM

## 2022-08-24 DIAGNOSIS — M54.12 CERVICAL RADICULOPATHY: ICD-10-CM

## 2022-08-24 PROCEDURE — 99396 PREV VISIT EST AGE 40-64: CPT | Performed by: NURSE PRACTITIONER

## 2022-08-24 PROCEDURE — 99213 OFFICE O/P EST LOW 20 MIN: CPT | Performed by: NURSE PRACTITIONER

## 2022-08-24 NOTE — PROGRESS NOTES
"Chief Complaint  Establish Care, Annual Exam, Neck Pain (Degeneration, knumb tingle, both arms and hands, shoulders pop/Xray 2m ago.), and Sinus Problem (X 1yr chronic)    Subjective        Ericka Goel presents to [facility]  History of Present Illness  HPI    Mrs. Goel presents today for an annual physical exam as a new patient.     The patient reports she is doing good. She states she does have some health problems, but no major concerns. She notes she may need surgery for the degeneration in her neck and spine. The patient reports she has numbness and tingling. She denies pain except for the last few months. She states she has been experiencing a sudden, sharp, needle sensation going through the muscle. She notes she gets numbness and tingly in her arm. She reports raising her arm irritates it. The patient states her shoulders \"pop all of the time.\" She notes she is not currently taking any medications. She has a history of arthritis. The patient reports she does not have asthma. She states she had an abnormal pap smear as a teenager. She had a cyst removal. She notes her brother had heart disease and her passed away, and cancer and COPD on her father's side. The patient reports she smokes a half a pack a day. She denies any drug use. 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 She notes she lives alone, and has a dog and a kitten. She reports she has not had the pneumonia vaccine, and she does not usually get vaccines anymore. She request to be tested for HIV. She states she has been on a vegetarian diet for about 3 years.    She notes she has not had her nose or mouth scoped. She notes she may have GERD. The patient reports she saw a ENT and he did a CT scan. She notes he wanted to do surgery. The patient reports surgery is a \"last resort for her.\" She states \"it is presumed she has an allergy to most general anesthesia.\" She notes she did try a nasal spray, Mucinex, and Motrin. " "She reports she did not get any relief. She states there is a bad odor when it acts up. She notes she has tried antibiotics.    Allergies  KEFLEX  GEODON  CEPHALOSPORINS  PENICILLINS  SULFA    Objective   Vital Signs:  /64 (BP Location: Right arm, Patient Position: Sitting, Cuff Size: Adult)   Pulse 93   Temp 96.7 °F (35.9 °C) (Temporal)   Resp 18   Ht 165.1 cm (65\")   Wt 54.9 kg (121 lb)   SpO2 94%   BMI 20.14 kg/m²   Estimated body mass index is 20.14 kg/m² as calculated from the following:    Height as of this encounter: 165.1 cm (65\").    Weight as of this encounter: 54.9 kg (121 lb).    BMI is within normal parameters. No other follow-up for BMI required.      Physical Exam  Vitals reviewed.   Constitutional:       General: She is not in acute distress.     Appearance: She is well-developed. She is not diaphoretic.   HENT:      Head: Normocephalic and atraumatic.      Right Ear: Tympanic membrane, ear canal and external ear normal.      Left Ear: Tympanic membrane, ear canal and external ear normal.      Ears:      Comments:  Canals are in normal limits.     Nose: Nose normal.      Comments:  Nares are patent     Mouth/Throat:      Mouth: Mucous membranes are moist.      Pharynx: Uvula midline. No oropharyngeal exudate.      Comments: Pharynx is clear  Eyes:      Pupils: Pupils are equal, round, and reactive to light.      Comments: Conjunctive is clear.   Neck:      Thyroid: No thyromegaly.   Cardiovascular:      Rate and Rhythm: Normal rate and regular rhythm.      Heart sounds: Normal heart sounds. No murmur heard.    No friction rub. No gallop.   Pulmonary:      Effort: Pulmonary effort is normal. No respiratory distress.      Breath sounds: Normal breath sounds. No wheezing or rales.   Abdominal:      General: Bowel sounds are normal. There is no distension.      Palpations: Abdomen is soft.      Tenderness: There is no abdominal tenderness.   Musculoskeletal:      Cervical back: Neck supple. "      Right lower leg: No edema.      Left lower leg: No edema.      Comments: She has full range of motion in her neck and lumbar spine. Minimal edema on her neck. Nontender to palpate.    Lymphadenopathy:      Cervical: No cervical adenopathy.   Skin:     General: Skin is warm and dry.      Comments: intact   Neurological:      Mental Status: She is alert and oriented to person, place, and time.   Psychiatric:         Mood and Affect: Mood normal.        Result Review :  DAXRESULTS                Assessment and Plan   Diagnoses and all orders for this visit:    1. Annual physical exam (Primary)    2. Routine screening for STI (sexually transmitted infection)  -     HIV-1 / O / 2 Ag / Antibody 4th Generation    3. Vitamin D deficiency  -     Vitamin D 25 hydroxy  -     Comprehensive metabolic panel    4. Numbness and tingling in both hands  -     Vitamin B12  -     Comprehensive metabolic panel    5. Mixed hyperlipidemia  -     Lipid Panel With LDL / HDL Ratio    6. Chronic maxillary sinusitis  -     Ambulatory Referral to ENT (Otolaryngology)    7. Cervical radiculopathy  -     MRI Cervical Spine Without Contrast; Future    8. Lower extremity numbness  -     MRI Lumbar Spine Without Contrast; Future      DAXPLAN    We will order blood work.  I referred her to Tori falk for her sinuses.  We will proceed with the MRI of her lumbar and cervical spine due to the radiculopathy. We are going to check a B12 level as well. For her sinuses, we are going to refer to [facility] at her request. She will follow up annually, sooner as needed.         Follow Up   No follow-ups on file.  Patient was given instructions and counseling regarding her condition or for health maintenance advice. Please see specific information pulled into the AVS if appropriate.       Transcribed from ambient dictation for WESTON Young by Abby Najera.  08/24/22   18:53 EDT    Patient verbalized consent to the visit recording.  I have  personally performed the services described in this document as transcribed by the above individual, and it is both accurate and complete.  Lauryn Mclean, APRN  8/25/2022  09:07 EDT    Information/counseling provided to the patient regarding periodic sukhdev maintenance recommendations, including but not limited to immunizations, diet/exercise/healthy lifestyle, laboratory, and other screenings. BMI is discussed. Appropriate exercise, diet, and weight plans are discussed.

## 2022-08-25 LAB
25(OH)D3+25(OH)D2 SERPL-MCNC: 27.1 NG/ML (ref 30–100)
ALBUMIN SERPL-MCNC: 4.5 G/DL (ref 3.8–4.8)
ALBUMIN/GLOB SERPL: 1.7 {RATIO} (ref 1.2–2.2)
ALP SERPL-CCNC: 129 IU/L (ref 44–121)
ALT SERPL-CCNC: 12 IU/L (ref 0–32)
AST SERPL-CCNC: 17 IU/L (ref 0–40)
BILIRUB SERPL-MCNC: <0.2 MG/DL (ref 0–1.2)
BUN SERPL-MCNC: 8 MG/DL (ref 6–24)
BUN/CREAT SERPL: 12 (ref 9–23)
CALCIUM SERPL-MCNC: 9.5 MG/DL (ref 8.7–10.2)
CHLORIDE SERPL-SCNC: 101 MMOL/L (ref 96–106)
CHOLEST SERPL-MCNC: 249 MG/DL (ref 100–199)
CO2 SERPL-SCNC: 28 MMOL/L (ref 20–29)
CREAT SERPL-MCNC: 0.68 MG/DL (ref 0.57–1)
EGFRCR-CYS SERPLBLD CKD-EPI 2021: 106 ML/MIN/1.73
GLOBULIN SER CALC-MCNC: 2.6 G/DL (ref 1.5–4.5)
GLUCOSE SERPL-MCNC: 98 MG/DL (ref 65–99)
HDLC SERPL-MCNC: 42 MG/DL
HIV 1+2 AB+HIV1 P24 AG SERPL QL IA: NON REACTIVE
LDLC SERPL CALC-MCNC: 171 MG/DL (ref 0–99)
LDLC/HDLC SERPL: 4.1 RATIO (ref 0–3.2)
POTASSIUM SERPL-SCNC: 4.1 MMOL/L (ref 3.5–5.2)
PROT SERPL-MCNC: 7.1 G/DL (ref 6–8.5)
SODIUM SERPL-SCNC: 143 MMOL/L (ref 134–144)
TRIGL SERPL-MCNC: 195 MG/DL (ref 0–149)
VIT B12 SERPL-MCNC: 352 PG/ML (ref 232–1245)
VLDLC SERPL CALC-MCNC: 36 MG/DL (ref 5–40)

## 2022-09-01 ENCOUNTER — TELEPHONE (OUTPATIENT)
Dept: FAMILY MEDICINE CLINIC | Facility: CLINIC | Age: 52
End: 2022-09-01

## 2022-09-01 NOTE — TELEPHONE ENCOUNTER
Caller: Ericka Goel    Relationship: Self    Best call back number: [phone]    What orders are you requesting (i.e. lab or imaging):MRI OF THE THORACIC SPINE REGION AS WELL      In what timeframe would the patient need to come in: AS SOON AS POSSIBLE    Where will you receive your lab/imaging services: [facility]    Additional notes:   PATIENT STATED THAT SHE WOULD LIKE TO GET THE ENTIRE SPINE WITH THE MRI WHILE SHE'S THERE AND SHE FELT INSURANCE WOULD BE BETTER WITH THIS AS WELL.    PATIENT WOULD LIKE TO GET THE ENTIRE SPINE DONE WITH ONE VISIT.

## 2022-09-06 ENCOUNTER — TELEPHONE (OUTPATIENT)
Dept: FAMILY MEDICINE CLINIC | Facility: CLINIC | Age: 52
End: 2022-09-06

## 2022-09-12 ENCOUNTER — TELEPHONE (OUTPATIENT)
Dept: FAMILY MEDICINE CLINIC | Facility: CLINIC | Age: 52
End: 2022-09-12

## 2022-09-12 NOTE — TELEPHONE ENCOUNTER
[facility]     Best call back number: [phone]    Who are you requesting to speak with (clinical staff, provider,  specific staff member): CLINICAL STAFF     What was the call regarding: NEEDING MORE CLINICAL INFORMATION FOR MRI SENT TO [facility] TRACKING NUMBER # [phone] CERVICAL SPINE TRACKING NUMBER# [id number] LUMBAR SPINE   FAX# [phone]

## 2022-09-15 ENCOUNTER — TELEPHONE (OUTPATIENT)
Dept: FAMILY MEDICINE CLINIC | Facility: CLINIC | Age: 52
End: 2022-09-15

## 2022-09-15 NOTE — TELEPHONE ENCOUNTER
Caller: Ericka Goel    Relationship: Self    Best call back number: [phone]    What was the call regarding: PATIENT IS REQUESTING TO SPEAK WITH MEENA REGARDING THE MRI PATIENT WAS SUPPOSED TO HAVE.     PATIENT STATED THAT HER INSURANCE WILL NOT COVER THE MRI.     PATIENT WOULD LIKE TO DISCUSS WHAT CAN BE DONE.     PLEASE CALL TO DISCUSS AND ADVISE.

## 2022-09-15 NOTE — TELEPHONE ENCOUNTER
She will need to do physical therapy for six weeks to see if there is improvement or worsening, then we can usually get MRI approved. If she is agreeable, will place order

## 2022-09-19 DIAGNOSIS — M54.12 CERVICAL RADICULOPATHY: ICD-10-CM

## 2022-09-19 DIAGNOSIS — R20.0 LOWER EXTREMITY NUMBNESS: ICD-10-CM

## 2022-09-19 DIAGNOSIS — R20.0 NUMBNESS AND TINGLING IN BOTH HANDS: Primary | ICD-10-CM

## 2022-09-19 DIAGNOSIS — R20.2 NUMBNESS AND TINGLING IN BOTH HANDS: Primary | ICD-10-CM

## 2022-09-19 NOTE — TELEPHONE ENCOUNTER
She is checking with her insurance. She said she would go for the evaluation but didn't know if she wants them to work on her with her conditions. If you can place the order and go from there.

## 2023-01-09 ENCOUNTER — HOSPITAL ENCOUNTER (OUTPATIENT)
Dept: PHYSICAL THERAPY | Facility: HOSPITAL | Age: 53
Setting detail: THERAPIES SERIES
Discharge: HOME | End: 2023-01-09
Payer: COMMERCIAL

## 2023-01-09 DIAGNOSIS — M54.2 CERVICALGIA: ICD-10-CM

## 2023-01-09 DIAGNOSIS — M54.50 LOW BACK PAIN, UNSPECIFIED BACK PAIN LATERALITY, UNSPECIFIED CHRONICITY, UNSPECIFIED WHETHER SCIATICA PRESENT: Primary | ICD-10-CM

## 2023-01-09 PROCEDURE — 97162 PT EVAL MOD COMPLEX 30 MIN: CPT | Performed by: PHYSICAL THERAPIST
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
## 2023-01-13 ENCOUNTER — APPOINTMENT (OUTPATIENT)
Dept: PHYSICAL THERAPY | Facility: HOSPITAL | Age: 53
End: 2023-01-13
Payer: COMMERCIAL

## 2023-02-03 ENCOUNTER — HOSPITAL ENCOUNTER (OUTPATIENT)
Dept: PHYSICAL THERAPY | Facility: HOSPITAL | Age: 53
Setting detail: THERAPIES SERIES
Discharge: HOME | End: 2023-02-03
Payer: COMMERCIAL

## 2023-02-03 DIAGNOSIS — M54.50 LOW BACK PAIN, UNSPECIFIED BACK PAIN LATERALITY, UNSPECIFIED CHRONICITY, UNSPECIFIED WHETHER SCIATICA PRESENT: Primary | ICD-10-CM

## 2023-02-03 DIAGNOSIS — M54.2 CERVICALGIA: ICD-10-CM

## 2023-02-03 PROCEDURE — 97110 THERAPEUTIC EXERCISES: CPT | Performed by: PHYSICAL THERAPIST

## 2023-02-03 NOTE — THERAPY TREATMENT NOTE
Outpatient Physical Therapy Ortho Treatment Note  [facility]     Patient Name: Ericka Goel  : 1970  MRN: [id number]  Today's Date: 2/3/2023      Visit Date: 2023    Visit Dx:    ICD-10-CM ICD-9-CM   1. Low back pain, unspecified back pain laterality, unspecified chronicity, unspecified whether sciatica present  M54.50 724.2   2. Cervicalgia  M54.2 723.1       Patient Active Problem List   Diagnosis   • Abdominal pain   • Bipolar affective disorder (HCC)   • Persistent insomnia   • Hyperlipidemia   • Mixed incontinence   • Urinary incontinence   • Epidermoid cyst   • Patellar bursitis of right knee   • Seasonal allergies   • Cough   • Mild intermittent asthma with acute exacerbation   • Bronchitis   • Fever   • Sinus problem   • Ear pain   • Smoker   • Chronic maxillary sinusitis        Past Medical History:   Diagnosis Date   • Abnormal Pap smear of cervix     1 abnormal with normal f/u   • DDD (degenerative disc disease), cervical    • DDD (degenerative disc disease), lumbar    • Herpes     HSV 1 +        Past Surgical History:   Procedure Laterality Date   • CHOLECYSTECTOMY     • CYST REMOVAL      chin and knee                        PT Assessment/Plan     Row Name 23 1432          PT Assessment    Assessment Comments Ms. Goel returns for her first follow up PT session for C spine/L spine pain.  Her session was limited secondary to time constraints. Initited scapular girdle/postural strengtheing and flexibility program.  Issued for home. She did require mod to max cuing with several exercises.  Schedule printed and issued to patient. Ms. Goel continues to be a candidate for skilled physical therapy.  -GJ        PT Plan    PT Plan Comments assess resonse to initial HEP, warm up on UBE, continue to work on scapular girdle strengthening, add LTR, bridges, HS stretch, consider keeping HEP fairly simple to allow for optimal carry over  -GJ           User Key  (r) = Recorded By,  (t) = Taken By, (c) = Cosigned By    Initials Name Provider Type    Ricardo Orourke, PT Physical Therapist                   OP Exercises     Row Name 02/03/23 1419 02/03/23 1400          Subjective Comments    Subjective Comments -- today's not a good day, really stiff in my mid to upper back  -GJ        Subjective Pain    Pre-Treatment Pain Level -- 2  -GJ        Total Minutes    83439 - PT Therapeutic Exercise Minutes 28  -GJ --        Exercise 1    Exercise Name 1 -- UBE  -GJ     Time 1 -- 4 min  -GJ        Exercise 2    Exercise Name 2 -- shoulder ext, scap retraction  -GJ     Cueing 2 -- Verbal;Demo  -GJ     Sets 2 -- 2, each  -GJ     Reps 2 -- 10  -GJ     Time 2 -- RTB  -GJ     Additional Comments -- cues for TA cocntraction and to avoid sway back  -GJ        Exercise 3    Exercise Name 3 -- doorway stretch  -GJ     Cueing 3 -- Verbal  -GJ     Reps 3 -- 3  -GJ     Time 3 -- 20s  -GJ        Exercise 4    Exercise Name 4 -- seated chin tuck  -GJ     Cueing 4 -- Verbal;Demo  -GJ     Reps 4 -- 15  -GJ     Time 4 -- 5s  -GJ        Exercise 5    Exercise Name 5 -- supine HA  -GJ     Cueing 5 -- Verbal;Demo  -GJ     Sets 5 -- 2  -GJ     Reps 5 -- 10  -GJ     Time 5 -- RTB  -GJ     Additional Comments -- max tactile cues for form  -GJ        Exercise 6    Exercise Name 6 -- SL trunk rotation  -GJ     Cueing 6 -- Verbal;Demo  -GJ     Reps 6 -- 5  -GJ     Time 6 -- 5s  -GJ     Additional Comments -- mod cues for technique  -GJ        Exercise 7    Exercise Name 7 -- LTR  -GJ     Cueing 7 -- --  -GJ     Reps 7 -- --  -GJ     Time 7 -- --  -GJ     Additional Comments -- next session  -GJ           User Key  (r) = Recorded By, (t) = Taken By, (c) = Cosigned By    Initials Name Provider Type    Ricardo Orourke, PT Physical Therapist                              PT OP Goals     Row Name 02/03/23 1400          PT Short Term Goals    STG Date to Achieve 02/17/23  -GJ     STG 1 pt. to be I with initial HEP to facilitate  self management of their condition  -GJ     STG 1 Progress Ongoing  -GJ     STG 1 Progress Comments initiated  -GJ     STG 2 pt. to be educated in/verbalize understanding of the importance of posture/ergonomics in association with their condition to facilitate self management of their condition  -GJ     STG 2 Progress Ongoing  -GJ     STG 2 Progress Comments reviewed  -GJ        Long Term Goals    LTG Date to Achieve 03/24/23  -GJ     LTG 1 pt. to be I with advanced HEP to facilitate self management of their condition  -GJ     LTG 1 Progress Ongoing  -GJ     LTG 2 pt. to report an NDI </= 20% to demonstrate decreased level of perceived disability  -GJ     LTG 2 Progress Ongoing  -GJ     LTG 3 pt to demosntrate R cspine AROm rotation >/= 45 to facilitate ease/safety with functional activities such as driving  -GJ     LTG 3 Progress Ongoing  -GJ     LTG 4 pt to demonstrate bilateral shoulder flexion/abd MMT >/=4+/5 to facilitate ease/safety with performing household/work activities  -GJ     LTG 4 Progress Ongoing  -GJ           User Key  (r) = Recorded By, (t) = Taken By, (c) = Cosigned By    Initials Name Provider Type    Ricardo Orourke, PT Physical Therapist                Therapy Education  Education Details: reviewed importance of mobility, activity modifications, discussed importance of timeliness with arrival times for appointments. Printed schedule for patient  Given: HEP, Symptoms/condition management, Pain management, Posture/body mechanics, Mobility training  Program: New  How Provided: Verbal, Demonstration, Written  Provided to: Patient  Level of Understanding: Teach back education performed, Demonstrated, Verbalized              Time Calculation:   Start Time: 1418 (appt time 1400)  Stop Time: 1446  Time Calculation (min): 28 min  Timed Charges  58171 - PT Therapeutic Exercise Minutes: 28  Total Minutes  Timed Charges Total Minutes: 28   Total Minutes: 28  Therapy Charges for Today     Code  Description Service Date Service Provider Modifiers Qty    85499101545 HC PT THER PROC EA 15 MIN 2/3/2023 Ricardo Iglesias, PT GP 2                    Ricardo Iglesias, PT  2/3/2023

## 2023-02-07 ENCOUNTER — TELEPHONE (OUTPATIENT)
Dept: PHYSICAL THERAPY | Facility: HOSPITAL | Age: 53
End: 2023-02-07
Payer: COMMERCIAL

## 2023-02-07 NOTE — TELEPHONE ENCOUNTER
Left voicemail regarding no show for today's appointment. Reminded pt of next appointment date and time, as well as reminded of 24 hour cancellation policy. Requested pt call back if unable to make next appointment.

## 2023-02-10 ENCOUNTER — HOSPITAL ENCOUNTER (OUTPATIENT)
Dept: PHYSICAL THERAPY | Facility: HOSPITAL | Age: 53
Setting detail: THERAPIES SERIES
Discharge: HOME | End: 2023-02-10
Payer: COMMERCIAL

## 2023-02-10 DIAGNOSIS — M54.2 CERVICALGIA: ICD-10-CM

## 2023-02-10 DIAGNOSIS — M54.50 LOW BACK PAIN, UNSPECIFIED BACK PAIN LATERALITY, UNSPECIFIED CHRONICITY, UNSPECIFIED WHETHER SCIATICA PRESENT: Primary | ICD-10-CM

## 2023-02-10 PROCEDURE — 97530 THERAPEUTIC ACTIVITIES: CPT | Performed by: PHYSICAL THERAPIST

## 2023-02-10 PROCEDURE — 97140 MANUAL THERAPY 1/> REGIONS: CPT | Performed by: PHYSICAL THERAPIST

## 2023-02-10 PROCEDURE — 97110 THERAPEUTIC EXERCISES: CPT | Performed by: PHYSICAL THERAPIST

## 2023-02-10 NOTE — THERAPY PROGRESS REPORT/RE-CERT
Outpatient Physical Therapy Ortho Progress Note  [facility]     Patient Name: Ericka Goel  : 1970  MRN: [id number]  Today's Date: 2/10/2023      Visit Date: 02/10/2023    Visit Dx:    ICD-10-CM ICD-9-CM   1. Low back pain, unspecified back pain laterality, unspecified chronicity, unspecified whether sciatica present  M54.50 724.2   2. Cervicalgia  M54.2 723.1       Patient Active Problem List   Diagnosis   • Abdominal pain   • Bipolar affective disorder (HCC)   • Persistent insomnia   • Hyperlipidemia   • Mixed incontinence   • Urinary incontinence   • Epidermoid cyst   • Patellar bursitis of right knee   • Seasonal allergies   • Cough   • Mild intermittent asthma with acute exacerbation   • Bronchitis   • Fever   • Sinus problem   • Ear pain   • Smoker   • Chronic maxillary sinusitis        Past Medical History:   Diagnosis Date   • Abnormal Pap smear of cervix     1 abnormal with normal f/u   • DDD (degenerative disc disease), cervical    • DDD (degenerative disc disease), lumbar    • Herpes     HSV 1 +        Past Surgical History:   Procedure Laterality Date   • CHOLECYSTECTOMY     • CYST REMOVAL      chin and knee        PT Ortho     Row Name 02/10/23 1400       Posture/Observations    Alignment Options Forward head;Cervical lordosis;Rounded shoulders;Lumbar lordosis;Genu varus  -GJ    Forward Head Mild;Moderate  -GJ    Cervical Lordosis Decreased  -GJ    Rounded Shoulders Bilateral:;Mild;Moderate  -GJ    Lumbar lordosis Decreased  -GJ    Genu varus Bilateral:;Mild;Moderate  -GJ       Myotomal Screen- Upper Quarter Clearing    Shoulder flexion (C5) Bilateral:;4 (Good)  -GJ    Elbow flexion/wrist extension (C6) Bilateral:;5 (Normal)  -GJ    Elbow extension/wrist flexion (C7) Bilateral:;5 (Normal)  -GJ    Finger flexion/ (C8) Bilateral:  -GJ    Finger abduction (T1) Bilateral:;5 (Normal)  -GJ       Cervical/Shoulder ROM Screen    Cervical rotation Impaired  R 40, L 50  -GJ        Cervical Accessory Motions    PA glide- C3 Hypomobile  -GJ    PA glide- C4 Hypomobile  -GJ    PA glide- C5 Hypomobile  -GJ    PA glide- C6 Hypomobile  -GJ    PA glide- C7 Hypomobile  -GJ       Thoracic Accessory Motions    Pa glide- Upper thoracic Hypomobile  -GJ    Pa glide- Middle thoracic Hypomobile  -GJ       Cervical/Thoracic Special Tests    Cervical Distraction (Foraminal Compression vs. Facet Pain) Positive  -GJ       Flexibility    Flexibility Tested? Upper Extremity  -GJ       Upper Extremity Flexibility    Pect Minor Bilateral:;Moderately limited  -GJ    Pect Major Moderately limited  -GJ          User Key  (r) = Recorded By, (t) = Taken By, (c) = Cosigned By    Initials Name Provider Type    Ricardo Orourke, PT Physical Therapist                             PT Assessment/Plan     Row Name 02/10/23 0249          PT Assessment    Functional Limitations Limitation in home management;Performance in leisure activities;Performance in work activities  -GJ     Assessment Comments Ms. Goel is a 51 yo R handed female. She has attended 3 sessions of physical therapy for her c spine and L spine pain. She reports that she doesn't feel much difference at this time. Primary complaint today is N/T BUE's.  Progress toward goals is limited secondary to limited visits, having met 0 of 2 STG's and 0 of 4 LTG's. She demosntrates FHP, rounded shoulder posture with noted shortned pec minor/major tissues, hpyo mobbile T/C spine PIVMs. Discussed obtaining bilateral wrist cock up splints to sleep in at night to see if this helps with her CTS like symptoms.  Trial of manual c spine distraction today. Pt. reported some relief following traction. She requires cues for technique during exercises. Ms. Goel continues to be a candidate for skilled physical therapy.  -GJ     Please refer to paper survey for additional self-reported information Yes  -GJ     Rehab Potential Good  -GJ     Patient/caregiver participated in  establishment of treatment plan and goals Yes  -GJ     Patient would benefit from skilled therapy intervention Yes  -GJ        PT Plan    PT Frequency 1x/week;2x/week  -GJ     Predicted Duration of Therapy Intervention (PT) 10 visits  -GJ     Planned CPT's? PT RE-EVAL: 73501;PT THER PROC EA 15 MIN: 01363;PT THER ACT EA 15 MIN: 17084;PT MANUAL THERAPY EA 15 MIN: 09428;PT HOT OR COLD PACK TREAT MCARE;PT ELECTRICAL STIM UNATTEND: ;PT TRACTION CERVICAL: 24550;PT TRACTION LUMBAR: 97610  -GJ     PT Plan Comments assess response to trial of c spine traction, consider mechanical traction if beneficial. Warm up on UBE, continue with postural/scapular strengthening, add doorway stretch, supine shoulder ER, supine over noodle alt shoulder flexion  -GJ           User Key  (r) = Recorded By, (t) = Taken By, (c) = Cosigned By    Initials Name Provider Type     Ricardo Iglesias, PT Physical Therapist                   OP Exercises     Row Name 02/10/23 1447 02/10/23 1400          Subjective Comments    Subjective Comments -- my low back felt messed up and twisted for a few days after last session. Today, my R shoulder is bothering me, like I have a pinched nerve in there  -GJ        Total Minutes    33355 - PT Therapeutic Exercise Minutes 17  -GJ --     47056 - PT Therapeutic Activity Minutes 12  assessment/education  -GJ --     56070 - PT Manual Therapy Minutes 13  -GJ --        Exercise 1    Exercise Name 1 -- UBE  -GJ     Time 1 -- 4 min  -GJ        Exercise 2    Exercise Name 2 -- shoulder ext, scap retraction  -GJ     Cueing 2 -- Verbal;Demo  -GJ     Sets 2 -- 2, each  -GJ     Reps 2 -- 10  -GJ     Time 2 -- RTB  -GJ     Additional Comments -- cues for TA cocntraction and to avoid sway back, and to avoid scapular elevation  -GJ        Exercise 4    Exercise Name 4 -- seated chin tuck  -GJ     Cueing 4 -- Verbal;Demo  -GJ     Reps 4 -- 15  -GJ     Time 4 -- 5s  -GJ        Exercise 5    Exercise Name 5 -- supine HA  -GJ      Cueing 5 -- Verbal;Demo  -GJ     Sets 5 -- 2  -GJ     Reps 5 -- 10  -GJ     Time 5 -- RTB  -GJ           User Key  (r) = Recorded By, (t) = Taken By, (c) = Cosigned By    Initials Name Provider Type    Ricardo Orourke, PT Physical Therapist                         Manual Rx (last 36 hours)     Manual Treatments     Row Name 02/10/23 1500 02/10/23 1447          Total Minutes    92451 - PT Manual Therapy Minutes -- 13  -GJ        Manual Rx 1    Manual Rx 1 Location trial of c spine distraction  -GJ --     Manual Rx 1 Type suboccipital release  -GJ --     Manual Rx 1 Grade gentle stretch of bilateral pec minor  tissues  -GJ --           User Key  (r) = Recorded By, (t) = Taken By, (c) = Cosigned By    Initials Name Provider Type    Ricardo Orourke, PT Physical Therapist                 PT OP Goals     Row Name 02/10/23 1400          PT Short Term Goals    STG Date to Achieve 02/17/23  -GJ     STG 1 pt. to be I with initial HEP to facilitate self management of their condition  -GJ     STG 1 Progress Ongoing  -GJ     STG 1 Progress Comments reviewed, cues required  -GJ     STG 2 pt. to be educated in/verbalize understanding of the importance of posture/ergonomics in association with their condition to facilitate self management of their condition  -GJ     STG 2 Progress Ongoing  -GJ     STG 2 Progress Comments reviewed  -GJ        Long Term Goals    LTG Date to Achieve 03/24/23  -GJ     LTG 1 pt. to be I with advanced HEP to facilitate self management of their condition  -GJ     LTG 1 Progress Ongoing  -GJ     LTG 2 pt. to report an NDI </= 20% to demonstrate decreased level of perceived disability  -GJ     LTG 2 Progress Ongoing  -GJ     LTG 2 Progress Comments pt did not complete form, did answer 2 of 10 questions  -GJ     LTG 3 pt to demosntrate R cspine AROm rotation >/= 45 to facilitate ease/safety with functional activities such as driving  -GJ     LTG 3 Progress Ongoing  -GJ     LTG 3 Progress Comments  see ortho  -GJ     LTG 4 pt to demonstrate bilateral shoulder flexion/abd MMT >/=4+/5 to facilitate ease/safety with performing household/work activities  -     LTG 4 Progress Partially Met  -           User Key  (r) = Recorded By, (t) = Taken By, (c) = Cosigned By    Initials Name Provider Type    Ricardo Orourke, PT Physical Therapist                Therapy Education  Education Details: discussed wearing of wrist cock up splints at night to see if this helps with her N/T of her UE's/CTS. Discussed importance of postural strengthening/awareness,discussed possibility of home c spine traction devices  Given: HEP, Symptoms/condition management, Pain management, Posture/body mechanics, Mobility training  Program: Reinforced  How Provided: Verbal  Provided to: Patient  Level of Understanding: Teach back education performed, Verbalized, Demonstrated    Outcome Measure Options: Neck Disability Index (NDI) (did not complete NDI, 2 questions unaswered)         Time Calculation:   Start Time: 1447  Stop Time: 1530  Time Calculation (min): 43 min  Timed Charges  55830 - PT Therapeutic Exercise Minutes: 17  64911 - PT Manual Therapy Minutes: 13  03850 - PT Therapeutic Activity Minutes: 12 (assessment/education)  Total Minutes  Timed Charges Total Minutes: 42   Total Minutes: 42  Therapy Charges for Today     Code Description Service Date Service Provider Modifiers Qty    34967559114 HC PT THER PROC EA 15 MIN 2/10/2023 Ricardo Iglesias, PT GP 1    62930866131 HC PT THERAPEUTIC ACT EA 15 MIN 2/10/2023 Ricardo Iglesias, PT GP 1    47187909008 HC PT MANUAL THERAPY EA 15 MIN 2/10/2023 Ricardo Iglesias, PT GP 1          PT G-Codes  Outcome Measure Options: Neck Disability Index (NDI) (did not complete NDI, 2 questions unaswered)         Ricardo Iglesias PT  2/10/2023

## 2023-02-14 ENCOUNTER — TELEPHONE (OUTPATIENT)
Dept: PHYSICAL THERAPY | Facility: HOSPITAL | Age: 53
End: 2023-02-14
Payer: COMMERCIAL

## 2023-02-14 NOTE — TELEPHONE ENCOUNTER
Left voicemail regarding no show for today's appointment. Reminded pt of next appointment date and time, as well as reminded of 24 hour cancellation policy, as she has had 2 no show appointments and 1 same day cancel. Requested pt call back if unable to make next appointment.

## 2023-02-17 ENCOUNTER — HOSPITAL ENCOUNTER (OUTPATIENT)
Dept: PHYSICAL THERAPY | Facility: HOSPITAL | Age: 53
Setting detail: THERAPIES SERIES
Discharge: HOME | End: 2023-02-17
Payer: COMMERCIAL

## 2023-02-17 DIAGNOSIS — M54.2 CERVICALGIA: ICD-10-CM

## 2023-02-17 DIAGNOSIS — M54.50 LOW BACK PAIN, UNSPECIFIED BACK PAIN LATERALITY, UNSPECIFIED CHRONICITY, UNSPECIFIED WHETHER SCIATICA PRESENT: Primary | ICD-10-CM

## 2023-02-17 PROCEDURE — 97140 MANUAL THERAPY 1/> REGIONS: CPT | Performed by: PHYSICAL THERAPIST

## 2023-02-17 PROCEDURE — 97110 THERAPEUTIC EXERCISES: CPT | Performed by: PHYSICAL THERAPIST

## 2023-02-17 NOTE — THERAPY TREATMENT NOTE
Outpatient Physical Therapy Ortho Treatment Note  [facility]     Patient Name: Ericka Goel  : 1970  MRN: [id number]  Today's Date: 2023      Visit Date: 2023    Visit Dx:    ICD-10-CM ICD-9-CM   1. Low back pain, unspecified back pain laterality, unspecified chronicity, unspecified whether sciatica present  M54.50 724.2   2. Cervicalgia  M54.2 723.1       Patient Active Problem List   Diagnosis   • Abdominal pain   • Bipolar affective disorder (HCC)   • Persistent insomnia   • Hyperlipidemia   • Mixed incontinence   • Urinary incontinence   • Epidermoid cyst   • Patellar bursitis of right knee   • Seasonal allergies   • Cough   • Mild intermittent asthma with acute exacerbation   • Bronchitis   • Fever   • Sinus problem   • Ear pain   • Smoker   • Chronic maxillary sinusitis        Past Medical History:   Diagnosis Date   • Abnormal Pap smear of cervix     1 abnormal with normal f/u   • DDD (degenerative disc disease), cervical    • DDD (degenerative disc disease), lumbar    • Herpes     HSV 1 +        Past Surgical History:   Procedure Laterality Date   • CHOLECYSTECTOMY     • CYST REMOVAL      chin and knee                        PT Assessment/Plan     Row Name 23 1536          PT Assessment    Assessment Comments Ms. Goel returns, reportining minimal change in her condition. We continued to work on scapular girdle strengthening requiring regular cuing for form/posture/technique. She demonstrates R>L anteriorly tilted scapula. Encoruaged her to focus on stretching her anterior/pec tissues.  She reports continued N/T of digits 1-3 bilterally. Encouraged her to consider use of cock up splint bracing at night.  Updated HEP.  Offered mechanical traction today, however pt was hesitant and requested manual c spine traction today. Ms. Goel continues to be a candidate for skilled physical therapy.  -GJ        PT Plan    PT Plan Comments assess response to updated scapular  gridle strengthening, consider supine over foam roll alt arms, ? mechanical c spine traction  -GJ           User Key  (r) = Recorded By, (t) = Taken By, (c) = Cosigned By    Initials Name Provider Type    Ricardo Orourke, PT Physical Therapist                   OP Exercises     Row Name 02/17/23 1433 02/17/23 1400          Subjective Comments    Subjective Comments -- I'm the same but I think the N/T in my fingers are getting better. I have been doing massage with my chiropractor. i moved some bags of clothes around and that sees to undo it every time  -GJ        Total Minutes    96202 - PT Therapeutic Exercise Minutes 30  -GJ --     42340 - PT Manual Therapy Minutes 15  -GJ --        Exercise 1    Exercise Name 1 -- UBE  -GJ     Time 1 -- 4 min  -GJ        Exercise 2    Exercise Name 2 -- shoulder ext, scap retraction  -GJ     Cueing 2 -- Verbal;Demo  -GJ     Sets 2 -- 2, each  -GJ     Reps 2 -- 10  -GJ     Time 2 -- GTB  -GJ     Additional Comments -- cues for TA cocntraction and to avoid sway back, and to avoid scapular elevation  -GJ        Exercise 3    Exercise Name 3 -- doorway stretch  -GJ     Cueing 3 -- Verbal  -GJ     Reps 3 -- 4  -GJ     Time 3 -- 20s  -GJ        Exercise 4    Exercise Name 4 -- quadraped chin tucks  -GJ     Cueing 4 -- Verbal;Demo  -GJ     Reps 4 -- 15  -GJ     Time 4 -- 5s  -GJ        Exercise 5    Exercise Name 5 -- supine HA  -GJ     Cueing 5 -- Verbal;Demo  -GJ     Sets 5 -- 2  -GJ     Reps 5 -- 10  -GJ     Time 5 -- RTB  -GJ        Exercise 8    Exercise Name 8 -- supine ER (bilateral shoulder)  -GJ     Cueing 8 -- Verbal;Demo  -GJ     Sets 8 -- 2  -GJ     Reps 8 -- 10  -GJ     Time 8 -- RTB  -GJ           User Key  (r) = Recorded By, (t) = Taken By, (c) = Cosigned By    Initials Name Provider Type    Ricardo Orourke, PT Physical Therapist                         Manual Rx (last 36 hours)     Manual Treatments     Row Name 02/17/23 1500 02/17/23 1433          Total Minutes     95532 - PT Manual Therapy Minutes -- 15  -GJ        Manual Rx 1    Manual Rx 1 Location c spine distraction  -GJ --     Manual Rx 1 Type suboccipital release  -GJ --     Manual Rx 1 Grade gentle stretch of bilateral pec minor  tissues  -GJ --           User Key  (r) = Recorded By, (t) = Taken By, (c) = Cosigned By    Initials Name Provider Type    Ricardo Orourke, PT Physical Therapist                 PT OP Goals     Row Name 02/17/23 1400          PT Short Term Goals    STG Date to Achieve 02/17/23  -GJ     STG 1 pt. to be I with initial HEP to facilitate self management of their condition  -GJ     STG 1 Progress Ongoing  -GJ     STG 2 pt. to be educated in/verbalize understanding of the importance of posture/ergonomics in association with their condition to facilitate self management of their condition  -GJ     STG 2 Progress Ongoing  -GJ        Long Term Goals    LTG Date to Achieve 03/24/23  -GJ     LTG 1 pt. to be I with advanced HEP to facilitate self management of their condition  -GJ     LTG 1 Progress Ongoing  -GJ     LTG 2 pt. to report an NDI </= 20% to demonstrate decreased level of perceived disability  -GJ     LTG 2 Progress Ongoing  -GJ     LTG 3 pt to demosntrate R cspine AROm rotation >/= 45 to facilitate ease/safety with functional activities such as driving  -GJ     LTG 3 Progress Ongoing  -GJ     LTG 4 pt to demonstrate bilateral shoulder flexion/abd MMT >/=4+/5 to facilitate ease/safety with performing household/work activities  -GJ     LTG 4 Progress Partially Met  -GJ           User Key  (r) = Recorded By, (t) = Taken By, (c) = Cosigned By    Initials Name Provider Type    Ricardo Orourke, PT Physical Therapist                Therapy Education  Education Details: encoruaged her to consider use of wrist cock up splints at night. Discussed postural awareness and activity modifications.  Given: HEP, Symptoms/condition management, Pain management, Posture/body mechanics, Mobility  training  Program: Reinforced, New, Progressed  How Provided: Verbal, Demonstration, Written  Provided to: Patient  Level of Understanding: Teach back education performed, Verbalized, Demonstrated              Time Calculation:   Start Time: 1444  Stop Time: 1530  Time Calculation (min): 46 min  Timed Charges  85616 - PT Therapeutic Exercise Minutes: 30  45639 - PT Manual Therapy Minutes: 15  Total Minutes  Timed Charges Total Minutes: 45   Total Minutes: 45  Therapy Charges for Today     Code Description Service Date Service Provider Modifiers Qty    99609348226  PT THER PROC EA 15 MIN 2/17/2023 Ricardo Iglesias, PT GP 2    [id number]  PT MANUAL THERAPY EA 15 MIN 2/17/2023 Ricardo Iglesias, PT GP 1                    Ricardo Iglesias, PT  2/17/2023

## 2023-02-21 ENCOUNTER — APPOINTMENT (OUTPATIENT)
Dept: PHYSICAL THERAPY | Facility: HOSPITAL | Age: 53
End: 2023-02-21
Payer: COMMERCIAL

## 2023-02-24 ENCOUNTER — HOSPITAL ENCOUNTER (OUTPATIENT)
Dept: PHYSICAL THERAPY | Facility: HOSPITAL | Age: 53
Setting detail: THERAPIES SERIES
Discharge: HOME | End: 2023-02-24
Payer: COMMERCIAL

## 2023-02-24 DIAGNOSIS — M54.50 LOW BACK PAIN, UNSPECIFIED BACK PAIN LATERALITY, UNSPECIFIED CHRONICITY, UNSPECIFIED WHETHER SCIATICA PRESENT: Primary | ICD-10-CM

## 2023-02-24 DIAGNOSIS — M54.2 CERVICALGIA: ICD-10-CM

## 2023-02-24 PROCEDURE — 97110 THERAPEUTIC EXERCISES: CPT | Performed by: PHYSICAL THERAPIST

## 2023-02-24 NOTE — THERAPY TREATMENT NOTE
Outpatient Physical Therapy Ortho Treatment Note  [facility]     Patient Name: Ericka Goel  : 1970  MRN: [id number]  Today's Date: 2023      Visit Date: 2023    Visit Dx:    ICD-10-CM ICD-9-CM   1. Low back pain, unspecified back pain laterality, unspecified chronicity, unspecified whether sciatica present  M54.50 724.2   2. Cervicalgia  M54.2 723.1       Patient Active Problem List   Diagnosis   • Abdominal pain   • Bipolar affective disorder (HCC)   • Persistent insomnia   • Hyperlipidemia   • Mixed incontinence   • Urinary incontinence   • Epidermoid cyst   • Patellar bursitis of right knee   • Seasonal allergies   • Cough   • Mild intermittent asthma with acute exacerbation   • Bronchitis   • Fever   • Sinus problem   • Ear pain   • Smoker   • Chronic maxillary sinusitis        Past Medical History:   Diagnosis Date   • Abnormal Pap smear of cervix     1 abnormal with normal f/u   • DDD (degenerative disc disease), cervical    • DDD (degenerative disc disease), lumbar    • Herpes     HSV 1 +        Past Surgical History:   Procedure Laterality Date   • CHOLECYSTECTOMY     • CYST REMOVAL      chin and knee                        PT Assessment/Plan     Row Name 23 1542          PT Assessment    Assessment Comments Ms. Goel returns reporting that she feels as if she is improving. We cotinued to work on scapular girdle/postural strengthening.  She requires mod to max cuing for posture/technique during exercises. We did update her HEP to include high scapular rows and printed her pictures for her.  Secondary to time constraints, held on c spine traction.  She has one additional session established. We discussed possible transition to HEP vs. continuing once a week x 3-4 weeks to continue to progress strengthening.  Ms. Goel continues to be a candidate for skilled physicla therapy.  -GJ        PT Plan    PT Plan Comments possible transition to independent managemnet vs  adding once a week x 3-4 weeks.  May consider traction if time permits  -GJ           User Key  (r) = Recorded By, (t) = Taken By, (c) = Cosigned By    Initials Name Provider Type    Ricardo Orourke, PT Physical Therapist                   OP Exercises     Row Name 02/24/23 1500 02/24/23 1416          Subjective Comments    Subjective Comments I think I'm getting better  -GJ --        Total Minutes    65069 - PT Therapeutic Exercise Minutes -- 33  -GJ        Exercise 1    Exercise Name 1 UBE  -GJ --     Time 1 4 min  -GJ --        Exercise 2    Exercise Name 2 shoulder ext, scap retraction (high/low)  -GJ --     Cueing 2 Verbal;Demo  -GJ --     Sets 2 2, each  -GJ --     Reps 2 10  -GJ --     Time 2 GTB  -GJ --     Additional Comments cues for TA cocntraction and to avoid sway back, and to avoid scapular elevation  -GJ --        Exercise 4    Exercise Name 4 quadraped chin tucks  -GJ --     Cueing 4 Verbal;Demo  -GJ --     Reps 4 15  -GJ --     Time 4 5s  -GJ --        Exercise 5    Exercise Name 5 supine HA  -GJ --     Cueing 5 Verbal;Demo  -GJ --     Sets 5 2  -GJ --     Reps 5 10  -GJ --     Time 5 RTB  -GJ --     Additional Comments over foam roll  -GJ --        Exercise 8    Exercise Name 8 supine ER (bilateral shoulder)  -GJ --     Cueing 8 Verbal;Demo  -GJ --     Sets 8 2  -GJ --     Reps 8 10  -GJ --     Time 8 RTB  -GJ --     Additional Comments over foam roll  -GJ --           User Key  (r) = Recorded By, (t) = Taken By, (c) = Cosigned By    Initials Name Provider Type    Ricardo Orourke, PT Physical Therapist                              PT OP Goals     Row Name 02/24/23 1500          PT Short Term Goals    STG Date to Achieve 02/17/23  -GJ     STG 1 pt. to be I with initial HEP to facilitate self management of their condition  -GJ     STG 1 Progress Met  -GJ     STG 2 pt. to be educated in/verbalize understanding of the importance of posture/ergonomics in association with their condition to  facilitate self management of their condition  -GJ     STG 2 Progress Met  -GJ        Long Term Goals    LTG Date to Achieve 03/24/23  -GJ     LTG 1 pt. to be I with advanced HEP to facilitate self management of their condition  -GJ     LTG 1 Progress Ongoing  -GJ     LTG 2 pt. to report an NDI </= 20% to demonstrate decreased level of perceived disability  -GJ     LTG 2 Progress Ongoing  -GJ     LTG 3 pt to demosntrate R cspine AROm rotation >/= 45 to facilitate ease/safety with functional activities such as driving  -GJ     LTG 3 Progress Ongoing  -GJ     LTG 4 pt to demonstrate bilateral shoulder flexion/abd MMT >/=4+/5 to facilitate ease/safety with performing household/work activities  -GJ     LTG 4 Progress Partially Met  -GJ           User Key  (r) = Recorded By, (t) = Taken By, (c) = Cosigned By    Initials Name Provider Type     Ricardo Iglesias, PT Physical Therapist                Therapy Education  Education Details: discussed benefits of mechanical vs. manual c spine traction, updated HEP, discussed possible transition to independent management after next session  Given: HEP, Symptoms/condition management, Pain management, Posture/body mechanics  Program: Reinforced, New, Progressed  How Provided: Verbal, Demonstration, Written  Provided to: Patient  Level of Understanding: Teach back education performed, Verbalized, Demonstrated              Time Calculation:   Start Time: 1456 (appt time 1445)  Stop Time: 1530  Time Calculation (min): 34 min  Timed Charges  70086 - PT Therapeutic Exercise Minutes: 33  Total Minutes  Timed Charges Total Minutes: 33   Total Minutes: 33  Therapy Charges for Today     Code Description Service Date Service Provider Modifiers Qty    70582595108 HC PT THER PROC EA 15 MIN 2/24/2023 Ricardo Iglesias, PT GP 2                    Ricardo Iglesias, PT  2/24/2023

## 2023-02-28 ENCOUNTER — APPOINTMENT (OUTPATIENT)
Dept: PHYSICAL THERAPY | Facility: HOSPITAL | Age: 53
End: 2023-02-28
Payer: COMMERCIAL

## 2023-03-03 ENCOUNTER — HOSPITAL ENCOUNTER (OUTPATIENT)
Dept: PHYSICAL THERAPY | Facility: HOSPITAL | Age: 53
Setting detail: THERAPIES SERIES
Discharge: HOME | End: 2023-03-03
Payer: COMMERCIAL

## 2023-03-03 DIAGNOSIS — M54.50 LOW BACK PAIN, UNSPECIFIED BACK PAIN LATERALITY, UNSPECIFIED CHRONICITY, UNSPECIFIED WHETHER SCIATICA PRESENT: Primary | ICD-10-CM

## 2023-03-03 DIAGNOSIS — M54.2 CERVICALGIA: ICD-10-CM

## 2023-03-03 PROCEDURE — 97110 THERAPEUTIC EXERCISES: CPT

## 2023-03-03 PROCEDURE — 97140 MANUAL THERAPY 1/> REGIONS: CPT

## 2023-03-03 NOTE — THERAPY TREATMENT NOTE
Outpatient Physical Therapy Ortho Treatment Note  [facility]     Patient Name: Ericka Goel  : 1970  MRN: [id number]  Today's Date: 3/3/2023      Visit Date: 2023    Visit Dx:    ICD-10-CM ICD-9-CM   1. Low back pain, unspecified back pain laterality, unspecified chronicity, unspecified whether sciatica present  M54.50 724.2   2. Cervicalgia  M54.2 723.1       Patient Active Problem List   Diagnosis   • Abdominal pain   • Bipolar affective disorder (HCC)   • Persistent insomnia   • Hyperlipidemia   • Mixed incontinence   • Urinary incontinence   • Epidermoid cyst   • Patellar bursitis of right knee   • Seasonal allergies   • Cough   • Mild intermittent asthma with acute exacerbation   • Bronchitis   • Fever   • Sinus problem   • Ear pain   • Smoker   • Chronic maxillary sinusitis        Past Medical History:   Diagnosis Date   • Abnormal Pap smear of cervix     1 abnormal with normal f/u   • DDD (degenerative disc disease), cervical    • DDD (degenerative disc disease), lumbar    • Herpes     HSV 1 +        Past Surgical History:   Procedure Laterality Date   • CHOLECYSTECTOMY     • CYST REMOVAL      chin and knee                        PT Assessment/Plan     Row Name 23 1400          PT Assessment    Assessment Comments Rosaline returns to clinic with reports of feeling therapy is beginning to help and increased awareness of correct form with HEP. Discussed realistic plan with goal to be able to transition to independent management, while she does not quite feel ready to discharge plan to continue at 1x/week for ~3-4 sessions to focus on improved strength and technique with home program with focus on improved independence. Initial cueing for better control and technique with H-A but able to perform second set independently. Pt. will continue to benefit from skilled PT.  -        PT Plan    PT Plan Comments mechanical traction? update HEP and begin to focus on transition to  indpendent management vs. adding new exercises  -MH           User Key  (r) = Recorded By, (t) = Taken By, (c) = Cosigned By    Initials Name Provider Type     Rita Peralta, PT Physical Therapist                   OP Exercises     Row Name 03/03/23 1400             Subjective Comments    Subjective Comments I think it's getting a little better, I would like to keep coming  -MH         Total Minutes    09325 - PT Therapeutic Exercise Minutes 30  -MH      93218 - PT Manual Therapy Minutes 10  -MH         Exercise 1    Exercise Name 1 UBE  -MH      Cueing 1 Verbal  -MH      Time 1 4 min  -MH         Exercise 3    Exercise Name 3 supine beach chair stretch  -MH      Cueing 3 Verbal  -MH      Reps 3 10  -MH      Time 3 10sec  -MH      Additional Comments over foam roll  -MH         Exercise 4    Exercise Name 4 quadraped chin tucks  -MH      Cueing 4 Verbal;Demo  -MH      Reps 4 15  -MH      Time 4 5s  -MH         Exercise 5    Exercise Name 5 supine HA  -MH      Cueing 5 Verbal;Demo  -MH      Sets 5 2  -MH      Reps 5 10  -MH      Time 5 RTB  -MH      Additional Comments over foam roll  -MH         Exercise 8    Exercise Name 8 supine ER (bilateral shoulder)  -MH      Cueing 8 Verbal;Demo  -MH      Sets 8 2  -MH      Reps 8 10  -MH      Time 8 RTB  -MH      Additional Comments over foam roll  -MH            User Key  (r) = Recorded By, (t) = Taken By, (c) = Cosigned By    Initials Name Provider Type     Rita Peralta, PT Physical Therapist                         Manual Rx (last 36 hours)     Manual Treatments     Row Name 03/03/23 1400             Total Minutes    16570 - PT Manual Therapy Minutes 10  -MH         Manual Rx 1    Manual Rx 1 Location c spine distraction  -      Manual Rx 1 Type suboccipital release  -      Manual Rx 1 Grade gentle stretch of bilateral pec minor  tissues  -MH            User Key  (r) = Recorded By, (t) = Taken By, (c) = Cosigned By    Initials Name Provider Type    LEONARDO Peralta  JACOBY Salinas Physical Therapist                 PT OP Goals     Row Name 03/03/23 1400          PT Short Term Goals    STG Date to Achieve 02/17/23  -     STG 1 pt. to be I with initial HEP to facilitate self management of their condition  -     STG 1 Progress Met  -     STG 2 pt. to be educated in/verbalize understanding of the importance of posture/ergonomics in association with their condition to facilitate self management of their condition  -     STG 2 Progress Met  -        Long Term Goals    LTG Date to Achieve 03/24/23  -     LTG 1 pt. to be I with advanced HEP to facilitate self management of their condition  -     LTG 1 Progress Ongoing  -     LTG 2 pt. to report an NDI </= 20% to demonstrate decreased level of perceived disability  -     LTG 2 Progress Ongoing  -     LTG 3 pt to demosntrate R cspine AROm rotation >/= 45 to facilitate ease/safety with functional activities such as driving  -     LTG 3 Progress Ongoing  -     LTG 4 pt to demonstrate bilateral shoulder flexion/abd MMT >/=4+/5 to facilitate ease/safety with performing household/work activities  -     LTG 4 Progress Partially Met  -           User Key  (r) = Recorded By, (t) = Taken By, (c) = Cosigned By    Initials Name Provider Type     Rita Peralta PT Physical Therapist                Therapy Education  Education Details: discussed POC and frequency of PT with plans to transition to independent management following reduced frequency, scheduling at clinic as pt. verbalizes she is unsure why she would need wait list after established care  Given: HEP, Symptoms/condition management  Program: Reinforced  How Provided: Verbal, Demonstration  Provided to: Patient  Level of Understanding: Verbalized              Time Calculation:   Start Time: 1358  Stop Time: 1439  Time Calculation (min): 41 min  Total Timed Code Minutes- PT: 40 minute(s)  Timed Charges  99925 - PT Therapeutic Exercise Minutes: 30  43428 - PT  Manual Therapy Minutes: 10  Total Minutes  Timed Charges Total Minutes: 40   Total Minutes: 40  Therapy Charges for Today     Code Description Service Date Service Provider Modifiers Qty    96812339857 HC PT MANUAL THERAPY EA 15 MIN 3/3/2023 Rita Peralta, PT GP 1    68297679547 HC PT THER PROC EA 15 MIN 3/3/2023 Rita Peralta, PT GP 2                    Rita Peralta, PT  3/3/2023

## 2023-03-16 ENCOUNTER — HOSPITAL ENCOUNTER (OUTPATIENT)
Dept: PHYSICAL THERAPY | Facility: HOSPITAL | Age: 53
Setting detail: THERAPIES SERIES
Discharge: HOME | End: 2023-03-16
Payer: COMMERCIAL

## 2023-03-16 DIAGNOSIS — M54.50 LOW BACK PAIN, UNSPECIFIED BACK PAIN LATERALITY, UNSPECIFIED CHRONICITY, UNSPECIFIED WHETHER SCIATICA PRESENT: Primary | ICD-10-CM

## 2023-03-16 DIAGNOSIS — M54.2 CERVICALGIA: ICD-10-CM

## 2023-03-16 PROCEDURE — 97110 THERAPEUTIC EXERCISES: CPT

## 2023-03-16 NOTE — THERAPY PROGRESS REPORT/RE-CERT
Outpatient Physical Therapy Ortho Progress Note  [facility]     Patient Name: Ericka Goel  : 1970  MRN: [id number]  Today's Date: 3/16/2023      Visit Date: 2023    Patient Active Problem List   Diagnosis   • Abdominal pain   • Bipolar affective disorder (HCC)   • Persistent insomnia   • Hyperlipidemia   • Mixed incontinence   • Urinary incontinence   • Epidermoid cyst   • Patellar bursitis of right knee   • Seasonal allergies   • Cough   • Mild intermittent asthma with acute exacerbation   • Bronchitis   • Fever   • Sinus problem   • Ear pain   • Smoker   • Chronic maxillary sinusitis        Past Medical History:   Diagnosis Date   • Abnormal Pap smear of cervix     1 abnormal with normal f/u   • DDD (degenerative disc disease), cervical    • DDD (degenerative disc disease), lumbar    • Herpes     HSV 1 +        Past Surgical History:   Procedure Laterality Date   • CHOLECYSTECTOMY     • CYST REMOVAL      chin and knee       Visit Dx:     ICD-10-CM ICD-9-CM   1. Low back pain, unspecified back pain laterality, unspecified chronicity, unspecified whether sciatica present  M54.50 724.2   2. Cervicalgia  M54.2 723.1              PT Ortho     Row Name 23 1500       Posture/Observations    Posture/Observations Comments observed decreased FHP in sitting and standing however she is compensating with trunk extension, worked on this/cued several times today  -JA       Cervical/Shoulder ROM Screen    Cervical flexion Impaired  -JA    Cervical extension Impaired  -JA    Cervical rotation Impaired  R 50%  -JA       MMT Right Upper Ext    Rt Upper Extremity Comments  prone scap ret rhomb 3/5; mid trap 3-/5  -JA       MMT Left Upper Ext    Lt Upper Extremity Comments  prone scap ret rhomb 3-3+/5, mid trap 3-/5  -JA        Strength Right    # Reps 3  -JA    Right Rung 2  -JA    Right  Test 1 42  -JA    Right  Test 2 25  -JA    Right  Test 3 25  -JA     Strength Average Right  30.67  -        Strength Left    # Reps 3  -JA    Left Rung 2  -JA    Left  Test 1 20  -JA    Left  Test 2 29  -JA    Left  Test 3 18  -JA     Strength Average Left 22.33  -JA          User Key  (r) = Recorded By, (t) = Taken By, (c) = Cosigned By    Initials Name Provider Type    Candi Morrell, PT Physical Therapist                            Therapy Education  Education Details: [id number] - Modified HEP due to change/decresae in strength. Printed and instructed to perform these until next visit.          PT Assessment/Plan     Row Name 03/16/23 1600          PT Assessment    Functional Limitations Limitation in home management;Limitations in community activities;Limitations in functional capacity and performance;Performance in leisure activities;Performance in self-care ADL;Performance in work activities  -JANEL     Impairments Pain;Muscle strength;Range of motion;Poor body mechanics;Posture  -     Assessment Comments Ericka Goel has been seen for a total of 7 visits for chronic C/L spine pain. She reports intermittent N/T pia hands/UEs and intermittent weakness. She brought in copy of cervical MRI from 9/2020 that indicated mild-mod spinal canal narrowing at C3-4, advanced spondylosis C3-4 through C6-7 w/severe foraminal abnormality form C5-6 through C6-7 that could represent compressive myelomalacia or other nonspecific process though it is noted that the appearance could be related to artifact  from motion during the exam. Reassessment of  strength indicates 16-19# decrease, worse on L, since initial measurement 2 months ago. She has 3-/5 to 3+/5 weakness in dorsalscapular muscles when assessed today; she was unable to sustain scapular retraction in prone/against gravity when lifting arms slightly.  Due to the significant decrease in  strength and continued intermittent N/T as well as other weakness noted she may benefit from another cervical MRI due to concern  regarding cervical myelopathy. She will benefit from further testing and continuing skilled therapy services.  -     Rehab Potential Good  -     Patient/caregiver participated in establishment of treatment plan and goals Yes  -JANEL     Patient would benefit from skilled therapy intervention Yes  -JANEL        PT Plan    PT Frequency 2x/week;1x/week  -JANEL     Predicted Duration of Therapy Intervention (PT) 10 visits  -JANEL     Planned CPT's? PT EVAL LOW COMPLEXITY: 30265;PT RE-EVAL: 13845;PT THER PROC EA 15 MIN: 38449;PT THER ACT EA 15 MIN: 84285;PT MANUAL THERAPY EA 15 MIN: 04779;PT NEUROMUSC RE-EDUCATION EA 15 MIN: 42922;PT TRACTION CERVICAL: 83271;PT TRACTION LUMBAR: 09474  -JANEL     PT Plan Comments assess response to last visit, is her prone scap ret more coordinated (can she squeeze scap then lift hands without loss of retraction?)  -JANEL           User Key  (r) = Recorded By, (t) = Taken By, (c) = Cosigned By    Initials Name Provider Type    Candi Morrell, PT Physical Therapist                   OP Exercises     Row Name 03/16/23 1500             Subjective Comments    Subjective Comments I'm still getting numbness in my hands/arms, left is the worst. Sometimes can't hold phone.  -JA         Total Minutes    92839 - PT Therapeutic Exercise Minutes 45  -JA         Exercise 1    Exercise Name 1 UBE  -JA      Cueing 1 Verbal  -JA      Time 1 4 min  -JA         Exercise 2    Exercise Name 2 supine chin tuck  -JA      Cueing 2 Verbal;Demo  -JA      Reps 2 10  -JA      Additional Comments pt noted it felt good  -JA         Exercise 3    Exercise Name 3 supine beach chair stretch  -JA      Cueing 3 --  -JA      Reps 3 held due to reassessment  -JA      Time 3 --  -JA         Exercise 4    Exercise Name 4 quadraped chin tucks  -JA      Cueing 4 Verbal;Demo  -JA      Reps 4 held due to reassessment  -JA      Time 4 --  -JA         Exercise 5    Exercise Name 5 supine HA  -JA      Cueing 5 Verbal;Demo  -JA      Sets 5  "2  -JA      Reps 5 10  -JA      Time 5 RTB  -JA      Additional Comments cued TA and PPT; pt noted she doesn't feel the upper back working much, just her arms feel strain--turns out her dorsalscap mm are weak when tested in prone  -JA         Exercise 6    Exercise Name 6 added prone scap retraction \"I\"  -JA      Cueing 6 Verbal;Demo;Tactile  -JA      Reps 6 10  -JA      Time 6 3sec  -JA         Exercise 7    Exercise Name 7 added prone scap retraction \"T\"  -JA      Cueing 7 Verbal;Tactile;Demo  -JA      Reps 7 10  -JA      Time 7 3sec  -JA      Additional Comments very  poor recruitment of dorsal scap  -JA         Exercise 8    Exercise Name 8 seated ER (bilateral shoulder)  -JA      Cueing 8 Verbal;Demo  -JA      Sets 8 1  -JA      Reps 8 10  -JA      Time 8 RTB  -JA      Additional Comments reports N/T after the ex that lasts a bit, advised not to do at home for now  -JA         Exercise 9    Exercise Name 9 educated for dorsalscap muscles and role of those and abs for spinal stsabilization, looked at muscle chart on wall  -JA      Time 9 5 min  -JA         Exercise 10    Exercise Name 10 standing scapular retraction  -JA      Cueing 10 Verbal;Tactile;Demo  -JA      Sets 10 2  -JA      Reps 10 10  -JA      Time 10 copious cuing to stop substituting with thor extension and lumbar extensors  -JA         Exercise 11    Exercise Name 11 supine TA  -JA      Reps 11 10  -JA         Exercise 12    Exercise Name 12 reassessed for progress note  -            User Key  (r) = Recorded By, (t) = Taken By, (c) = Cosigned By    Initials Name Provider Type    Candi Morrell, PT Physical Therapist                                        Time Calculation:     Start Time: 1500  Stop Time: 1545  Time Calculation (min): 45 min  Timed Charges  04311 - PT Therapeutic Exercise Minutes: 45  Total Minutes  Timed Charges Total Minutes: 45   Total Minutes: 45     Therapy Charges for Today     Code Description Service Date Service " Provider Modifiers Qty    06107572695  PT THER PROC EA 15 MIN 3/16/2023 Candi Archuleta, PT GP 3                    Candi Archuleta, PT  3/16/2023

## 2023-03-24 ENCOUNTER — HOSPITAL ENCOUNTER (OUTPATIENT)
Dept: PHYSICAL THERAPY | Facility: HOSPITAL | Age: 53
Setting detail: THERAPIES SERIES
Discharge: HOME | End: 2023-03-24
Payer: COMMERCIAL

## 2023-03-24 DIAGNOSIS — M54.50 LOW BACK PAIN, UNSPECIFIED BACK PAIN LATERALITY, UNSPECIFIED CHRONICITY, UNSPECIFIED WHETHER SCIATICA PRESENT: Primary | ICD-10-CM

## 2023-03-24 DIAGNOSIS — M54.2 CERVICALGIA: ICD-10-CM

## 2023-03-24 PROCEDURE — 97110 THERAPEUTIC EXERCISES: CPT

## 2023-03-24 NOTE — THERAPY TREATMENT NOTE
Outpatient Physical Therapy Ortho Treatment Note  [facility]     Patient Name: Ericka Goel  : 1970  MRN: [id number]  Today's Date: 3/24/2023      Visit Date: 2023    Visit Dx:    ICD-10-CM ICD-9-CM   1. Low back pain, unspecified back pain laterality, unspecified chronicity, unspecified whether sciatica present  M54.50 724.2   2. Cervicalgia  M54.2 723.1       Patient Active Problem List   Diagnosis   • Abdominal pain   • Bipolar affective disorder (HCC)   • Persistent insomnia   • Hyperlipidemia   • Mixed incontinence   • Urinary incontinence   • Epidermoid cyst   • Patellar bursitis of right knee   • Seasonal allergies   • Cough   • Mild intermittent asthma with acute exacerbation   • Bronchitis   • Fever   • Sinus problem   • Ear pain   • Smoker   • Chronic maxillary sinusitis        Past Medical History:   Diagnosis Date   • Abnormal Pap smear of cervix     1 abnormal with normal f/u   • DDD (degenerative disc disease), cervical    • DDD (degenerative disc disease), lumbar    • Herpes     HSV 1 +        Past Surgical History:   Procedure Laterality Date   • CHOLECYSTECTOMY     • CYST REMOVAL      chin and knee        PT Ortho     Row Name 23 1400        Strength Right    # Reps 3  -MH    Right Rung 2  -MH    Right  Test 1 30  -MH    Right  Test 2 32  -MH    Right  Test 3 30  -MH     Strength Average Right 30.67  -MH        Strength Left    # Reps 3  -MH    Left Rung 2  -MH    Left  Test 1 30  -MH    Left  Test 2 25  -MH    Left  Test 3 23  -MH     Strength Average Left 26  -MH          User Key  (r) = Recorded By, (t) = Taken By, (c) = Cosigned By    Initials Name Provider Type    iRta Rdz, PT Physical Therapist                             PT Assessment/Plan     Row Name 23 1400          PT Assessment    Assessment Comments Rosaline arrives to clinic reporting feeling improvements in cervical pain, intermittent paresthesias  into UE but not at beginning of session. LBP more aggravated this date she relates to prone ther ex performed at last appointment. Held on prone ther ex and integraetd LTR, and S/L thoracic rotation to address spinal mobility. Pt.tolerated session reasonably well, reassessed  strength with R unchanged from last appointment and increase from 22# to 26# on L this date despite both decreasing from initial evaluation. Pt. remains appropriate for skilled PT.  -        PT Plan    PT Plan Comments  strength challenges?  -           User Key  (r) = Recorded By, (t) = Taken By, (c) = Cosigned By    Initials Name Provider Type     Rita Peralta, PT Physical Therapist                   OP Exercises     Row Name 03/24/23 1400             Subjective Comments    Subjective Comments My low back is bothering me, I think its the exercises I have to do laying on my stomach. My neck/arms are getting better, I still get pins and needles intermittently  -         Subjective Pain    Subjective Pain Comment Pt. original appointment 14:00, able to accommodate with later opening for 1445 appointment  -         Total Minutes    46299 - PT Therapeutic Exercise Minutes 38  -MH         Exercise 1    Exercise Name 1 UBE  -      Cueing 1 Verbal  -MH      Time 1 4 min  -MH         Exercise 2    Exercise Name 2 supine chin tuck  -      Cueing 2 Verbal;Demo  -MH      Reps 2 10  -MH      Time 2 5sec  -MH         Exercise 3    Exercise Name 3 supine beach chair stretch  -      Cueing 3 Verbal  -MH      Reps 3 10  -MH      Time 3 10sec  -MH         Exercise 5    Exercise Name 5 supine HA  -MH      Cueing 5 Verbal;Demo  -MH      Sets 5 2  -MH      Reps 5 10  -MH      Time 5 RTB  -MH         Exercise 6    Exercise Name 6 supine tuck + turn  -MH      Cueing 6 Verbal;Demo  -MH      Reps 6 10ea  -MH         Exercise 8    Exercise Name 8 seated ER (bilateral shoulder)  -MH      Cueing 8 Verbal;Demo  -MH      Sets 8 2  -MH      Reps  8 10  -MH      Time 8 RTB  -MH         Exercise 9    Exercise Name 9 S/L open book  -MH      Cueing 9 Verbal;Demo  -MH      Reps 9 10ea  -MH      Time 9 5sec  -MH         Exercise 12    Exercise Name 12 LTR  -MH      Cueing 12 Verbal;Demo  -MH      Reps 12 10ea  -MH      Time 12 5sec  -MH         Exercise 13    Exercise Name 13 shoulder ext  -MH      Cueing 13 Verbal;Demo  -MH      Sets 13 2  -MH      Reps 13 10  -MH      Time 13 RTB  -MH            User Key  (r) = Recorded By, (t) = Taken By, (c) = Cosigned By    Initials Name Provider Type     Rita Peralta, PT Physical Therapist                              PT OP Goals     Row Name 03/24/23 1400          PT Short Term Goals    STG Date to Achieve 02/17/23  -MH     STG 1 pt. to be I with initial HEP to facilitate self management of their condition  -     STG 1 Progress Met  -     STG 2 pt. to be educated in/verbalize understanding of the importance of posture/ergonomics in association with their condition to facilitate self management of their condition  -     STG 2 Progress Met  -        Long Term Goals    LTG Date to Achieve 03/24/23  -     LTG 1 pt. to be I with advanced HEP to facilitate self management of their condition  -     LTG 1 Progress Ongoing  -     LTG 2 pt. to report an NDI </= 20% to demonstrate decreased level of perceived disability  -     LTG 2 Progress Ongoing  -     LTG 3 pt to demosntrate R cspine AROm rotation >/= 45 to facilitate ease/safety with functional activities such as driving  -     LTG 3 Progress Ongoing  -     LTG 4 pt to demonstrate bilateral shoulder flexion/abd MMT >/=4+/5 to facilitate ease/safety with performing household/work activities  -     LTG 4 Progress Partially Met  -           User Key  (r) = Recorded By, (t) = Taken By, (c) = Cosigned By    Initials Name Provider Type    Rita Rdz, PT Physical Therapist                Therapy Education  Given: Symptoms/condition  management  Program: Reinforced  How Provided: Verbal  Provided to: Patient  Level of Understanding: Verbalized              Time Calculation:   Start Time: 1442  Stop Time: 1520  Time Calculation (min): 38 min  Total Timed Code Minutes- PT: 38 minute(s)  Timed Charges  98562 - PT Therapeutic Exercise Minutes: 38  Total Minutes  Timed Charges Total Minutes: 38   Total Minutes: 38  Therapy Charges for Today     Code Description Service Date Service Provider Modifiers Qty    42600119429 HC PT THER PROC EA 15 MIN 3/24/2023 Rita Peralta, PT GP 3                    Rita Peralta PT  3/24/2023

## 2023-04-20 ENCOUNTER — HOSPITAL ENCOUNTER (OUTPATIENT)
Dept: PHYSICAL THERAPY | Facility: HOSPITAL | Age: 53
Setting detail: THERAPIES SERIES
Discharge: HOME | End: 2023-04-20
Payer: COMMERCIAL

## 2023-04-20 DIAGNOSIS — R29.898 UPPER EXTREMITY WEAKNESS: ICD-10-CM

## 2023-04-20 DIAGNOSIS — M54.2 CERVICALGIA: ICD-10-CM

## 2023-04-20 DIAGNOSIS — M54.50 LOW BACK PAIN, UNSPECIFIED BACK PAIN LATERALITY, UNSPECIFIED CHRONICITY, UNSPECIFIED WHETHER SCIATICA PRESENT: Primary | ICD-10-CM

## 2023-04-20 PROCEDURE — 97110 THERAPEUTIC EXERCISES: CPT

## 2023-04-20 NOTE — THERAPY PROGRESS REPORT/RE-CERT
Outpatient Physical Therapy Ortho Re-Assessment  [facility]     Patient Name: Ericka Goel  : 1970  MRN: [id number]  Today's Date: 2023      Visit Date: 2023    Patient Active Problem List   Diagnosis   • Abdominal pain   • Bipolar affective disorder   • Persistent insomnia   • Hyperlipidemia   • Mixed incontinence   • Urinary incontinence   • Epidermoid cyst   • Patellar bursitis of right knee   • Seasonal allergies   • Cough   • Mild intermittent asthma with acute exacerbation   • Bronchitis   • Fever   • Sinus problem   • Ear pain   • Smoker   • Chronic maxillary sinusitis        Past Medical History:   Diagnosis Date   • Abnormal Pap smear of cervix     1 abnormal with normal f/u   • DDD (degenerative disc disease), cervical    • DDD (degenerative disc disease), lumbar    • Herpes     HSV 1 +        Past Surgical History:   Procedure Laterality Date   • CHOLECYSTECTOMY     • CYST REMOVAL      chin and knee       Visit Dx:     ICD-10-CM ICD-9-CM   1. Low back pain, unspecified back pain laterality, unspecified chronicity, unspecified whether sciatica present  M54.50 724.2   2. Cervicalgia  M54.2 723.1   3. Upper extremity weakness  R29.898 729.89              PT Ortho     Row Name 23 1500       Cervical/Shoulder ROM Screen    Cervical rotation --  L 45, R 45  -JA        Strength Right    # Reps 3  -JA    Right Rung 2  -JA    Right  Test 1 44  -JA    Right  Test 2 37  -JA    Right  Test 3 35  -JA     Strength Average Right 38.67  -JA        Strength Left    # Reps 3  -JA    Left Rung 2  -JA    Left  Test 1 32  -JA    Left  Test 2 37  -JA    Left  Test 3 35  -JA     Strength Average Left 34.67  -JA          User Key  (r) = Recorded By, (t) = Taken By, (c) = Cosigned By    Initials Name Provider Type    Candi Morrell, PT Physical Therapist                                   PT OP Goals     Row Name 23 1500          PT Short  "Term Goals    STG Date to Achieve 02/17/23  -     STG 1 pt. to be I with initial HEP to facilitate self management of their condition  -     STG 1 Progress Met  -     STG 2 pt. to be educated in/verbalize understanding of the importance of posture/ergonomics in association with their condition to facilitate self management of their condition  -     STG 2 Progress Met  -        Long Term Goals    LTG Date to Achieve 03/24/23  -     LTG 1 pt. to be I with advanced HEP to facilitate self management of their condition  -     LTG 1 Progress Progressing  -     LTG 2 pt. to report an NDI </= 20% to demonstrate decreased level of perceived disability  -     LTG 2 Progress Progressing  -     LTG 2 Progress Comments 32% down from 50% at initial visit  -     LTG 3 pt to demosntrate R cspine AROM rotation >/= 45 to facilitate ease/safety with functional activities such as driving  -     LTG 3 Progress Met  -     LTG 3 Progress Comments pia 45 deg rot  -     LTG 4 pt to demonstrate bilateral shoulder flexion/abd MMT >/=4+/5 to facilitate ease/safety with performing household/work activities  -     LTG 4 Progress Partially Met  -           User Key  (r) = Recorded By, (t) = Taken By, (c) = Cosigned By    Initials Name Provider Type    Candi Morrell, PT Physical Therapist                 PT Assessment/Plan     Row Name 04/20/23 1700          PT Assessment    Functional Limitations Limitation in home management;Limitations in community activities;Limitations in functional capacity and performance;Performance in leisure activities;Performance in self-care ADL  -     Impairments Pain;Muscle strength;Sensation;Range of motion;Posture;Poor body mechanics  -     Assessment Comments \"Rosaline\" Ericka SHAH Amando has been seen for 9 visits for chronic C/L spine pain. She returns to therapy after a 4 week break due to scheduling issues. She reports she has been limiting gripping with both hands " to the extent possible to reduce carpal tunnel symptoms and tendonitis symptoms as well as trying to sit/stand with better posture and to stay off her phone more.  She does demonstrate improved posture and no cuing required today. Reassess her AROM cervical rotation and noted increased R rotation now equal to L at 45 degrees and her  strength improved significantly from 30.67 to 38.67# for R  and 26 to 34.67# for L . We modified her strengthening today to avoid gripping and had her peform in positions against gravity with increased set/reps to fatigue instead of using resistance or weights with no immediate adverse response. Progressing toward goals, 2/2 STGs met, 2/4 LTGS met/partially met. She will benefit from continuing skilled therapy services.  -JANEL     Please refer to paper survey for additional self-reported information Yes  -JANEL     Rehab Potential Good  -JANEL     Patient/caregiver participated in establishment of treatment plan and goals Yes  -JANEL     Patient would benefit from skilled therapy intervention Yes  -JANEL        PT Plan    PT Frequency 2x/week;1x/week  -JANEL     Predicted Duration of Therapy Intervention (PT) 5 more visits  -JANEL     Planned CPT's? PT RE-EVAL: 52367;PT THER PROC EA 15 MIN: 88377;PT THER ACT EA 15 MIN: 13006;PT MANUAL THERAPY EA 15 MIN: 22183;PT NEUROMUSC RE-EDUCATION EA 15 MIN: 36056  -JANEL     PT Plan Comments cont with strengthening against gravity without gripping wt/resistance if still problematic, progress as able, consider Anthony chi-type strength/stability ex's(?), discuss working toward self-management, reinforce positive changes she has made  -JANEL           User Key  (r) = Recorded By, (t) = Taken By, (c) = Cosigned By    Initials Name Provider Type    Candi Mrorell, PT Physical Therapist                   OP Exercises     Row Name 04/20/23 1500             Subjective Comments    Subjective Comments Trying to avoid gripping tightly and looking down like using  "phone.  -JA         Subjective Pain    Able to rate subjective pain? yes  -JA         Total Minutes    61436 - PT Therapeutic Exercise Minutes 40  -JA         Exercise 1    Exercise Name 1 reverse shoulder rolls  -JA      Reps 1 10  -JA         Exercise 2    Exercise Name 2 supine chin tuck  -JA      Cueing 2 Verbal;Demo  -JA      Reps 2 10  -JA      Time 2 5sec  -JA         Exercise 3    Exercise Name 3 supine beach chair stretch  -JA      Cueing 3 Verbal  -JA      Reps 3 10  -JA      Time 3 10sec  -JA         Exercise 5    Exercise Name 5 supine HA  -JA      Cueing 5 Verbal;Demo  -JA      Sets 5 2  -JA      Reps 5 10  -JA      Time 5 for now, no band due to CTS and tendonitis  -JA         Exercise 6    Exercise Name 6 supine tuck + turn  -JA      Cueing 6 Verbal;Demo  -JA      Reps 6 10ea  -JA         Exercise 7    Exercise Name 7 supine alt and pia shld flexxion  -JA      Cueing 7 Verbal;Tactile;Demo  -JA      Reps 7 10 ea  -JA         Exercise 8    Exercise Name 8 \"T\" in modified qped kneeling on mat table with chest on large swiss ball  -JA      Sets 8 1  -JA      Reps 8 10  -JA         Exercise 9    Exercise Name 9 S/L open book  -JA      Cueing 9 Verbal;Demo  -JA      Reps 9 10ea  -JA      Time 9 5sec  -JA         Exercise 10    Exercise Name 10 SL ER w/towel roll  -JA      Cueing 10 Verbal;Tactile;Demo  -JA      Sets 10 2  -JA      Reps 10 10 - cued to take to fatigue since she cannot use weights at this time  -JA         Exercise 11    Exercise Name 11 SL HzAb  -JA      Cueing 11 Verbal;Demo;Tactile  -JA      Sets 11 2  -JA      Reps 11 10  -JA         Exercise 12    Exercise Name 12 assessed cerv AROM,  strength, NDI  -JA         Exercise 13    Exercise Name 13 --  -JA            User Key  (r) = Recorded By, (t) = Taken By, (c) = Cosigned By    Initials Name Provider Type    Candi Morrell, PT Physical Therapist                              Outcome Measure Options: Neck Disability Index " (NDI)  Neck Disability Index  Section 1 - Pain Intensity: The pain is mild at the moment.  Section 2 - Personal Care: I can look after myself normally without causing extra pain.  Section 3 - Lifting: I can lift very light weights.  Section 4 - Work: I cannot do my usual work.  Section 5 - Headaches: I have no headaches at all.  Section 6 - Concentration: I have a fair degree of difficulty in concentrating when I want to.  Section 7 - Sleeping: My sleep is mildly disturbed (1-2 hours sleepless).  Section 8 - Driving: I can drive as long as I want with slight neck pain.  Section 9 - Reading: I can read as much as I want with moderate neck pain.  Section 10 - Recreation: I am able to engage in all recreational activities with some pain in my neck.  Neck Disability Index Score: 16  Neck Disability Index Comments: 32%      Time Calculation:     Start Time: 1503  Stop Time: 1545  Time Calculation (min): 42 min  Timed Charges  81392 - PT Therapeutic Exercise Minutes: 40  Total Minutes  Timed Charges Total Minutes: 40   Total Minutes: 40     Therapy Charges for Today     Code Description Service Date Service Provider Modifiers Qty    20570482717 HC PT THER PROC EA 15 MIN 4/20/2023 Candi Archuleta, PT GP 3          PT G-Codes  Outcome Measure Options: Neck Disability Index (NDI)  Neck Disability Index Score: 16         Candi Archuleta, PT  4/20/2023

## 2023-04-28 ENCOUNTER — APPOINTMENT (OUTPATIENT)
Dept: PHYSICAL THERAPY | Facility: HOSPITAL | Age: 53
End: 2023-04-28
Payer: COMMERCIAL

## 2023-05-04 ENCOUNTER — HOSPITAL ENCOUNTER (OUTPATIENT)
Dept: PHYSICAL THERAPY | Facility: HOSPITAL | Age: 53
Setting detail: THERAPIES SERIES
Discharge: HOME | End: 2023-05-04
Payer: COMMERCIAL

## 2023-05-04 DIAGNOSIS — M54.50 LOW BACK PAIN, UNSPECIFIED BACK PAIN LATERALITY, UNSPECIFIED CHRONICITY, UNSPECIFIED WHETHER SCIATICA PRESENT: Primary | ICD-10-CM

## 2023-05-04 DIAGNOSIS — R29.898 UPPER EXTREMITY WEAKNESS: ICD-10-CM

## 2023-05-04 DIAGNOSIS — M54.2 CERVICALGIA: ICD-10-CM

## 2023-05-04 PROCEDURE — 97110 THERAPEUTIC EXERCISES: CPT

## 2023-05-04 NOTE — THERAPY TREATMENT NOTE
Outpatient Physical Therapy Ortho Treatment Note  [facility]     Patient Name: Ericka Goel  : 1970  MRN: [id number]  Today's Date: 2023      Visit Date: 2023    Visit Dx:    ICD-10-CM ICD-9-CM   1. Low back pain, unspecified back pain laterality, unspecified chronicity, unspecified whether sciatica present  M54.50 724.2   2. Cervicalgia  M54.2 723.1   3. Upper extremity weakness  R29.898 729.89       Patient Active Problem List   Diagnosis   • Abdominal pain   • Bipolar affective disorder   • Persistent insomnia   • Hyperlipidemia   • Mixed incontinence   • Urinary incontinence   • Epidermoid cyst   • Patellar bursitis of right knee   • Seasonal allergies   • Cough   • Mild intermittent asthma with acute exacerbation   • Bronchitis   • Fever   • Sinus problem   • Ear pain   • Smoker   • Chronic maxillary sinusitis        Past Medical History:   Diagnosis Date   • Abnormal Pap smear of cervix     1 abnormal with normal f/u   • DDD (degenerative disc disease), cervical    • DDD (degenerative disc disease), lumbar    • Herpes     HSV 1 +        Past Surgical History:   Procedure Laterality Date   • CHOLECYSTECTOMY     • CYST REMOVAL      chin and knee        PT Ortho     Row Name 23 1500       Myotomal Screen- Upper Quarter Clearing    Shoulder flexion (C5) Right:;4+ (Good +);Left:;4 (Good)  -JANEL    Elbow flexion/wrist extension (C6) Bilateral:;5 (Normal)  -JANEL    Elbow extension/wrist flexion (C7) Bilateral:;5 (Normal)  -JANEL          User Key  (r) = Recorded By, (t) = Taken By, (c) = Cosigned By    Initials Name Provider Type    Candi Morrell, PT Physical Therapist                             PT Assessment/Plan     Row Name 23 1600          PT Assessment    Assessment Comments Rosaline reports continuing to increase awareness of posture and strives to maintain good posture/positions throughout her day/activity. She demonstrates increasing shoulder strength and progress  toward goals. Able to further progress to spinal stablization with resistance today. We discussed plan to continue strengthening after formal therapy ends and develop program for her to use at [employer] that she joined. Getting close to discharge.  -JA        PT Plan    PT Plan Comments reassess NDI; MMT shoulders;  develop exercise plan for [employer] appropriate for her situation than involves weights/resistance for bone density and muscle strengthening including core for cervical/entire spine; possible D/C  -JANEL           User Key  (r) = Recorded By, (t) = Taken By, (c) = Cosigned By    Initials Name Provider Type    Candi Morrell, PT Physical Therapist                   OP Exercises     Row Name 05/04/23 1500             Subjective Comments    Subjective Comments I am much more aware of my posture and have made changes. I can tell I am stronger than when I first started.  -JANEL         Subjective Pain    Able to rate subjective pain? yes  -JA      Pre-Treatment Pain Level 0  -JA      Subjective Pain Comment states she has a discomfort but not pain that she would need medication  -JA         Total Minutes    29546 - PT Therapeutic Exercise Minutes 40  -JA         Exercise 1    Exercise Name 1 reverse shoulder rolls  -JA      Sets 1 2  -JA      Reps 1 10  -JA      Additional Comments warmup  -JA         Exercise 2    Exercise Name 2 supine chin tuck  -JA      Cueing 2 Verbal;Demo  -JA      Reps 2 10  -JA      Time 2 5sec  -JA         Exercise 3    Exercise Name 3 supine beach chair stretch  -JA      Cueing 3 Verbal  -JA      Reps 3 --  -JA      Time 3 --  -JA         Exercise 4    Exercise Name 4 HL on med density foam roller alt diagonals  -JA      Cueing 4 Verbal  -JA      Sets 4 2  -JA      Reps 4 5  -JA         Exercise 5    Exercise Name 5 stand with med density foam roller behind back  -JA      Cueing 5 Verbal;Demo  -JA      Sets 5 1  -JA      Reps 5 10  -JA      Time 5 YTB  -JA          "Exercise 6    Exercise Name 6 supine tuck + turn  -JA      Cueing 6 Verbal;Demo  -JA      Reps 6 10ea  -JA         Exercise 7    Exercise Name 7 supine alt and pia shld flexxion  -JA      Cueing 7 Verbal;Tactile;Demo  -JA      Reps 7 10 ea  -JA         Exercise 8    Exercise Name 8 \"T\" in modified qped kneeling on mat table with chest on large swiss ball  -JA      Sets 8 she is doing at home  -JA      Reps 8 --  -JA         Exercise 9    Exercise Name 9 S/L open book  -JA      Cueing 9 Verbal;Demo  -JA      Reps 9 10ea  -JA      Time 9 5sec  -JA         Exercise 10    Exercise Name 10 SL ER w/med density foam roller under spine  -JA      Cueing 10 Verbal;Tactile;Demo  -JA      Sets 10 2  -JA      Reps 10 10 - cued to take to fatigue since she cannot use weights at this time  -JA         Exercise 11    Exercise Name 11 --  -JA      Cueing 11 --  -JA      Sets 11 --  -JA      Reps 11 --  -JA      Time 11 --  -JA         Exercise 12    Exercise Name 12 HL chest/shld press on med density foam roller  -JA      Reps 12 15  -JA      Time 12 1#  -JA         Exercise 13    Exercise Name 13 sideways step and reach while keeping chin tuck, TA  -JA      Sets 13 alt L/R  -JA      Reps 13 10  -JA         Exercise 14    Exercise Name 14 AR press w/rsistance band with TA and chin tuck  -JA      Cueing 14 Verbal;Demo  -JA      Reps 14 10 ea  -JA      Time 14 YTB  -JA            User Key  (r) = Recorded By, (t) = Taken By, (c) = Cosigned By    Initials Name Provider Type    Candi Morrell N, PT Physical Therapist                              PT OP Goals     Row Name 05/04/23 1500          PT Short Term Goals    STG Date to Achieve 02/17/23  -     STG 1 pt. to be I with initial HEP to facilitate self management of their condition  -     STG 1 Progress Met  -     STG 2 pt. to be educated in/verbalize understanding of the importance of posture/ergonomics in association with their condition to facilitate self management of " their condition  -     STG 2 Progress Met  -        Long Term Goals    LTG Date to Achieve 03/24/23  -     LTG 1 pt. to be I with advanced HEP to facilitate self management of their condition  -     LTG 1 Progress Progressing  -     LTG 2 pt. to report an NDI </= 20% to demonstrate decreased level of perceived disability  -     LTG 2 Progress Progressing  -     LTG 2 Progress Comments will reassess next visit  -     LTG 3 pt to demosntrate R cspine AROM rotation >/= 45 to facilitate ease/safety with functional activities such as driving  -     LTG 3 Progress Met  -     LTG 4 pt to demonstrate bilateral shoulder flexion/abd MMT >/=4+/5 to facilitate ease/safety with performing household/work activities  -     LTG 4 Progress Partially Met  -           User Key  (r) = Recorded By, (t) = Taken By, (c) = Cosigned By    Initials Name Provider Type    Candi Morrell, PT Physical Therapist                Therapy Education  Education Details: discussed how she plans to continue strengthening, she joined [employer] but is unsure of what she can do there. We discussed that first and foremost do not use any piece of equipment that would cause her to position in poor posture; always be mindful of and use good posture to start and throughout the exercises. Also discussed Anthony Chi as a possible exercise for her.  Given: HEP, Symptoms/condition management, Posture/body mechanics, Mobility training  Program: Reinforced, Progressed  How Provided: Verbal, Demonstration  Provided to: Patient  Level of Understanding: Verbalized, Demonstrated              Time Calculation:   Start Time: 1500  Stop Time: 1545  Time Calculation (min): 45 min  Timed Charges  95725 - PT Therapeutic Exercise Minutes: 40  Total Minutes  Timed Charges Total Minutes: 40   Total Minutes: 40  Therapy Charges for Today     Code Description Service Date Service Provider Modifiers Qty    45607120415 HC PT THER PROC EA 15 MIN  5/4/2023 Candi Archuleta, PT GP 3                    Candi Archuleta, PT  5/4/2023

## 2023-05-12 ENCOUNTER — APPOINTMENT (OUTPATIENT)
Dept: PHYSICAL THERAPY | Facility: HOSPITAL | Age: 53
End: 2023-05-12
Payer: COMMERCIAL

## 2023-05-18 ENCOUNTER — APPOINTMENT (OUTPATIENT)
Dept: PHYSICAL THERAPY | Facility: HOSPITAL | Age: 53
End: 2023-05-18
Payer: COMMERCIAL

## 2023-05-26 ENCOUNTER — APPOINTMENT (OUTPATIENT)
Dept: PHYSICAL THERAPY | Facility: HOSPITAL | Age: 53
End: 2023-05-26
Payer: COMMERCIAL

## 2023-08-15 ENCOUNTER — OFFICE VISIT (OUTPATIENT)
Dept: FAMILY MEDICINE CLINIC | Facility: CLINIC | Age: 53
End: 2023-08-15
Payer: MEDICAID

## 2023-08-15 VITALS
HEIGHT: 65 IN | BODY MASS INDEX: 20.37 KG/M2 | OXYGEN SATURATION: 94 % | SYSTOLIC BLOOD PRESSURE: 118 MMHG | DIASTOLIC BLOOD PRESSURE: 70 MMHG | HEART RATE: 60 BPM | RESPIRATION RATE: 18 BRPM | WEIGHT: 122.3 LBS | TEMPERATURE: 96 F

## 2023-08-15 DIAGNOSIS — R19.00 ABDOMINAL WALL BULGE: Primary | ICD-10-CM

## 2023-08-15 PROCEDURE — 99213 OFFICE O/P EST LOW 20 MIN: CPT | Performed by: NURSE PRACTITIONER

## 2023-08-15 RX ORDER — ERYTHROMYCIN 250 MG/1
250 TABLET, COATED ORAL 4 TIMES DAILY
Qty: 28 TABLET | Refills: 0 | Status: SHIPPED | OUTPATIENT
Start: 2023-08-15

## 2023-08-15 NOTE — PROGRESS NOTES
Chief Complaint  Abdominal Pain (Sensitivity, around bellybutton knot., x 1 week/ Still having pain sticks out a little but not as big. ) and Sinusitis (Had teeth taken out 6 weeks ago, still having sinus issues. )    Subjective        Ericka Goel presents to [facility]  History of Present Illness  Ericka Goel is a 53-year-old female who presents today for abdominal pain.     The patient reports she has a knot in her abdomen for 1 week. She states it was bulging out with a long cylinder shape. She notes she has a metal clamp that was right above the belly button from when she underwent cholecystectomy procedure in 2009. She states it would normally be a little bit sensitive there; however, she notes she does not notice it sensitive there anymore. She notes she is hoping that it is not the clamp that has moved around.      She states her mother has a history of an abdominal aneurysm. She denies any heavy lifting recently or previous hernias. She notes she has a lot of things going on in her neck. She states she went through physical therapy.      She notes she still has a sinus infection. She states the last time she was here; she was seen for sinus infection. She states it is tender pushing on it. She notes it is only on one side. She notes she did not want to take anymore antibiotics at the time. She states she had the rest of her top teeth extracted 6 weeks ago and has dentures. She adds that has not helped. She denies being given antibiotics when they did the extractions. She denies any drainage or anything coming out of where any of the teeth were pulled. She notes she has been to the dentist several times since then.      She states she does not have a bowel movement every day. She notes she has a bowel movement every 3 to 4 days. She denies her stool being small pellet like or formed. She states she has had some cramping. She notes before she felt the lump and  "tenderness, she thought she was going to have her menstrual cycle; however, she notes she did not. She states she thinks she has already gone through menopause.      She notes she has not gained any weight; however, she notes she has noticed her abdomen getting bigger. She states she has tried every antibiotic except for erythromycin, and she has always tolerated it. She states she has struggled for a couple of years with allergies. She notes she has undergone an allergy test and was informed she is allergic to Jesus grass, cat dander, and dust mites. She adds she has experienced sinus issues and taking erythromycin improved her symptoms. She reports they wanted her to undergo endoscopic sinus surgery; however, she notes she does not want her face cut into.     She states she lost her health insurance, and she does not have any right now.   Objective   Vital Signs:  /70 (BP Location: Left arm, Patient Position: Sitting, Cuff Size: Adult)   Pulse 60   Temp 96 øF (35.6 øC) (Temporal)   Resp 18   Ht 165.1 cm (65\")   Wt 55.5 kg (122 lb 4.8 oz)   SpO2 94%   BMI 20.35 kg/mý   Estimated body mass index is 20.35 kg/mý as calculated from the following:    Height as of this encounter: 165.1 cm (65\").    Weight as of this encounter: 55.5 kg (122 lb 4.8 oz).     A review of systems was performed, and the pertinent positives are noted in the HPI.    BMI is within normal parameters. No other follow-up for BMI required.      Physical Exam  Vitals reviewed.   Constitutional:       General: She is not in acute distress.     Appearance: She is well-developed. She is not diaphoretic.   HENT:      Head: Normocephalic and atraumatic.      Right Ear: Tympanic membrane, ear canal and external ear normal.      Left Ear: Tympanic membrane, ear canal and external ear normal.      Nose: Nose normal.      Mouth/Throat:      Pharynx: Uvula midline. No oropharyngeal exudate.   Cardiovascular:      Rate and Rhythm: Normal rate and " "regular rhythm.      Heart sounds: Normal heart sounds. No murmur heard.    No friction rub. No gallop.   Pulmonary:      Effort: Pulmonary effort is normal. No respiratory distress.      Breath sounds: Normal breath sounds. No wheezing or rales.   Abdominal:      General: Bowel sounds are normal. There is no distension.      Palpations: Abdomen is soft.      Tenderness: There is no abdominal tenderness.   Musculoskeletal:      Cervical back: Neck supple.   Skin:     General: Skin is warm and dry.   Neurological:      Mental Status: She is alert and oriented to person, place, and time.   Psychiatric:         Mood and Affect: Mood normal.      /70 (BP Location: Left arm, Patient Position: Sitting, Cuff Size: Adult)   Pulse 60   Temp 96 øF (35.6 øC) (Temporal)   Resp 18   Ht 165.1 cm (65\")   Wt 55.5 kg (122 lb 4.8 oz)   SpO2 94%   BMI 20.35 kg/mý        Result Review :                   Assessment and Plan   Diagnoses and all orders for this visit:    1. Abdominal wall bulge (Primary)  -     US abdomen limited; Future    Other orders  -     erythromycin base (E-MYCIN) 250 MG tablet; Take 1 tablet by mouth 4 (Four) Times a Day.  Dispense: 28 tablet; Refill: 0    1. Abdominal pain  - An order was placed for an ultrasound of the abdomen.    2. Sinusitis  - Prescribed the patient erythromycin 250 mg 4 times daily for 7 days.           Follow Up   No follow-ups on file.  Patient was given instructions and counseling regarding her condition or for health maintenance advice. Please see specific information pulled into the AVS if appropriate.       Transcribed from ambient dictation for WESTON Young by Rosalinda Jackson.  08/15/23   17:02 EDT    Patient or patient representative verbalized consent to the visit recording.  I have personally performed the services described in this document as transcribed by the above individual, and it is both accurate and complete.'  "

## 2023-08-29 ENCOUNTER — HOSPITAL ENCOUNTER (OUTPATIENT)
Dept: ULTRASOUND IMAGING | Facility: HOSPITAL | Age: 53
Discharge: HOME | End: 2023-08-29
Admitting: NURSE PRACTITIONER

## 2023-08-29 DIAGNOSIS — R19.00 ABDOMINAL WALL BULGE: ICD-10-CM

## 2023-08-29 PROCEDURE — 76705 ECHO EXAM OF ABDOMEN: CPT

## 2023-09-01 ENCOUNTER — TELEPHONE (OUTPATIENT)
Dept: FAMILY MEDICINE CLINIC | Facility: CLINIC | Age: 53
End: 2023-09-01

## 2023-09-01 NOTE — TELEPHONE ENCOUNTER
"    Caller: Ericka Goel \"Rosaline\"    Relationship: Self    Best call back number: [phone]    What form or medical record are you requesting: COPY OF ULTRASOUND RESULTS DONE ON 08/29/2023    Who is requesting this form or medical record from you: SELF    How would you like to receive the form or medical records (pick-up, mail, fax): MAIL TO:     [phone]  [place] [address]     Timeframe paperwork needed: ASAP    Additional notes: THE PATIENT ALSO STATES THAT SHE IS READY FOR THE CT SCAN TO BE ORDERED.     "

## 2023-09-05 DIAGNOSIS — R19.00 ABDOMINAL WALL BULGE: Primary | ICD-10-CM
